# Patient Record
Sex: MALE | Race: ASIAN | NOT HISPANIC OR LATINO
[De-identification: names, ages, dates, MRNs, and addresses within clinical notes are randomized per-mention and may not be internally consistent; named-entity substitution may affect disease eponyms.]

---

## 2019-10-04 PROBLEM — Z00.00 ENCOUNTER FOR PREVENTIVE HEALTH EXAMINATION: Status: ACTIVE | Noted: 2019-10-04

## 2019-10-17 ENCOUNTER — APPOINTMENT (OUTPATIENT)
Dept: THORACIC SURGERY | Facility: CLINIC | Age: 74
End: 2019-10-17
Payer: MEDICARE

## 2019-10-17 VITALS
OXYGEN SATURATION: 97 % | BODY MASS INDEX: 21.19 KG/M2 | HEIGHT: 67 IN | TEMPERATURE: 97.6 F | RESPIRATION RATE: 16 BRPM | SYSTOLIC BLOOD PRESSURE: 97 MMHG | HEART RATE: 71 BPM | WEIGHT: 135 LBS | DIASTOLIC BLOOD PRESSURE: 56 MMHG

## 2019-10-17 DIAGNOSIS — B18.1 CHRONIC VIRAL HEPATITIS B W/OUT DELTA-AGENT: ICD-10-CM

## 2019-10-17 DIAGNOSIS — Z87.891 PERSONAL HISTORY OF NICOTINE DEPENDENCE: ICD-10-CM

## 2019-10-17 DIAGNOSIS — R59.0 LOCALIZED ENLARGED LYMPH NODES: ICD-10-CM

## 2019-10-17 PROCEDURE — 99205 OFFICE O/P NEW HI 60 MIN: CPT

## 2019-10-18 PROBLEM — B18.1 CHRONIC HEPATITIS B: Status: RESOLVED | Noted: 2019-10-18 | Resolved: 2019-10-18

## 2019-10-18 PROBLEM — Z87.891 FORMER SMOKER: Status: ACTIVE | Noted: 2019-10-18

## 2019-10-18 NOTE — REVIEW OF SYSTEMS
[Negative] : Heme/Lymph [As Noted in HPI] : as noted in HPI [Cough] : cough [SOB on Exertion] : shortness of breath during exertion

## 2019-10-21 NOTE — ASSESSMENT
[FreeTextEntry1] : Mr. GÓMEZ RODRIGUEZ, 73 year old male, former smoker, w/ hx of Hep B, who presented to PCP for routine screening for LUNG CA.\par \par CT Chest on 9/21/19:\par - new 9mm RUL nodule abutting the mediastinum (image 86), w/ a small amount of adjacent ggo and subtle areas of spiculation\par \par PFTs on 9/25/19: FVC 80%, FEV1 71%.\par \par PET/CT on 10/1/19:\par - 9 x 9mm SUV=4.6 RUL spiculated nodule abutting the pleura\par - subcentimeter Rt hilar LN w/ SUV=4.6 (image 110)\par - Rt hilar node (image 122)\par - subcarinal LN w/ SUV=3.2 (image 118)\par - Rt lower paratracheal LN with SUV=4.1\par - Lt tracheobronchial LN w/ SUV=3.4 (image 104)\par - Lt hilar LN w/ SUV=3.4 (image 111)\par - small para-aortic LN w/ SUV=2.2 (image 101)\par \par FB, EBUS bx on 10/4/19 at [place]. Path negative for malignancy. BAL negative malignancy.\par \par I have reviewed the patient's medical records and diagnostic images at time of this office consultation and have made the following recommendation:\par 1. PET/CT reviewed with pt and his daughter. Mediastinal lymphadenopathy, I recommended FB, mediastinoscopy, possible Rt VATS, robotic-assisted, wedge rxn of RUL, possible RULobectomy on 11/4/19. Risks and benefits and alternatives explained to patient, all questions answered, patient agreed to proceed with surgery.\par 2. Medical clearance and PST\par \par \par Written by Brittany Bey NP, acting as a scribe for Dr. Eligio Pringle. \par \par The documentation recorded by the scribe accurately reflects the service I personally performed and the decisions made by me. ELIGIO PRINGLE MD\par

## 2019-10-21 NOTE — REASON FOR VISIT
[Consultation] : a consultation visit [Spouse] : spouse [Other: _____] : [unfilled] [FreeTextEntry1] : Lung nodule

## 2019-10-21 NOTE — HISTORY OF PRESENT ILLNESS
[FreeTextEntry1] : Mr. GÓMEZ RODRIGUEZ, 73 year old male, former smoker, w/ hx of Hep B, who presented to PCP for routine screening for LUNG CA.\par \par CT Chest on 9/21/19:\par - new 9mm RUL nodule abutting the mediastinum (image 86), w/ a small amount of adjacent ggo and subtle areas of spiculation\par \par PFTs on 9/25/19: FVC 80%, FEV1 71%.\par \par PET/CT on 10/1/19:\par - 9 x 9mm SUV=4.6 RUL spiculated nodule abutting the pleura\par - subcentimeter Rt hilar LN w/ SUV=4.6 (image 110)\par - Rt hilar node (image 122)\par - subcarinal LN w/ SUV=3.2 (image 118)\par - Rt lower paratracheal LN with SUV=4.1\par - Lt tracheobronchial LN w/ SUV=3.4 (image 104)\par - Lt hilar LN w/ SUV=3.4 (image 111)\par - small para-aortic LN w/ SUV=2.2 (image 101)\par \par FB, EBUS bx on 10/4/19 at [place]. Path negative for malignancy. BAL negative malignancy.\par \par Patient is here today for CT Sx consultation, referred by Dr. Xavier Roa. Pt reports to cough with yellowish mucus and SOB on exertion, denies fever, chills, night sweats, hemoptysis, or weight loss.

## 2019-10-21 NOTE — DATA REVIEWED
[FreeTextEntry1] : CT Chest on 9/21/19:\par - new 9mm RUL nodule abutting the mediastinum (image 86), w/ a small amount of adjacent ggo and subtle areas of spiculation\par \par PFTs on 9/25/19: FVC 80%, FEV1 71%.\par \par PET/CT on 10/1/19:\par - 9 x 9mm SUV=4.6 RUL spiculated nodule abutting the pleura\par - subcentimeter Rt hilar LN w/ SUV=4.6 (image 110)\par - Rt hilar node (image 122)\par - subcarinal LN w/ SUV=3.2 (image 118)\par - Rt lower paratracheal LN with SUV=4.1\par - Lt tracheobronchial LN w/ SUV=3.4 (image 104)\par - Lt hilar LN w/ SUV=3.4 (image 111)\par - small para-aortic LN w/ SUV=2.2 (image 101)\par \par FB, EBUS bx on 10/4/19 at [place]. Path negative for malignancy. BAL negative malignancy.

## 2019-10-21 NOTE — CONSULT LETTER
[Consult Letter:] : I had the pleasure of evaluating your patient, [unfilled]. [Dear  ___] : Dear  [unfilled], [( Thank you for referring [unfilled] for consultation for _____ )] : Thank you for referring [unfilled] for consultation for [unfilled] [Please see my note below.] : Please see my note below. [Sincerely,] : Sincerely, [Consult Closing:] : Thank you very much for allowing me to participate in the care of this patient.  If you have any questions, please do not hesitate to contact me. [DrGalen  ___] : Dr. NAIDU [FreeTextEntry2] : Xavier Roa MD (PCP/Referring)\thaddeus Mercer MD (Pulm) [FreeTextEntry3] : Lon Khan MD, MPH \par System Director of Thoracic Surgery \par Director of Comprehensive Lung [facility] \par Professor Cardiovascular & Thoracic Surgery  \par [facility] [employer] [facility]\par

## 2019-10-25 ENCOUNTER — OUTPATIENT (OUTPATIENT)
Dept: OUTPATIENT SERVICES | Facility: HOSPITAL | Age: 74
LOS: 1 days | End: 2019-10-25
Payer: MEDICARE

## 2019-10-25 VITALS
WEIGHT: 138.01 LBS | OXYGEN SATURATION: 98 % | HEART RATE: 65 BPM | TEMPERATURE: 97 F | DIASTOLIC BLOOD PRESSURE: 84 MMHG | HEIGHT: 67 IN | SYSTOLIC BLOOD PRESSURE: 122 MMHG | RESPIRATION RATE: 14 BRPM

## 2019-10-25 DIAGNOSIS — R59.0 LOCALIZED ENLARGED LYMPH NODES: ICD-10-CM

## 2019-10-25 DIAGNOSIS — Z98.890 OTHER SPECIFIED POSTPROCEDURAL STATES: Chronic | ICD-10-CM

## 2019-10-25 LAB
ALBUMIN SERPL ELPH-MCNC: 4.5 G/DL — SIGNIFICANT CHANGE UP (ref 3.3–5)
ALP SERPL-CCNC: 80 U/L — SIGNIFICANT CHANGE UP (ref 40–120)
ALT FLD-CCNC: 23 U/L — SIGNIFICANT CHANGE UP (ref 4–41)
ANION GAP SERPL CALC-SCNC: 12 MMO/L — SIGNIFICANT CHANGE UP (ref 7–14)
AST SERPL-CCNC: 30 U/L — SIGNIFICANT CHANGE UP (ref 4–40)
BILIRUB SERPL-MCNC: < 0.2 MG/DL — LOW (ref 0.2–1.2)
BLD GP AB SCN SERPL QL: NEGATIVE — SIGNIFICANT CHANGE UP
BUN SERPL-MCNC: 20 MG/DL — SIGNIFICANT CHANGE UP (ref 7–23)
CALCIUM SERPL-MCNC: 9 MG/DL — SIGNIFICANT CHANGE UP (ref 8.4–10.5)
CHLORIDE SERPL-SCNC: 103 MMOL/L — SIGNIFICANT CHANGE UP (ref 98–107)
CO2 SERPL-SCNC: 23 MMOL/L — SIGNIFICANT CHANGE UP (ref 22–31)
CREAT SERPL-MCNC: 0.99 MG/DL — SIGNIFICANT CHANGE UP (ref 0.5–1.3)
GLUCOSE SERPL-MCNC: 127 MG/DL — HIGH (ref 70–99)
HCT VFR BLD CALC: 41.3 % — SIGNIFICANT CHANGE UP (ref 39–50)
HGB BLD-MCNC: 13.4 G/DL — SIGNIFICANT CHANGE UP (ref 13–17)
MCHC RBC-ENTMCNC: 31.8 PG — SIGNIFICANT CHANGE UP (ref 27–34)
MCHC RBC-ENTMCNC: 32.4 % — SIGNIFICANT CHANGE UP (ref 32–36)
MCV RBC AUTO: 97.9 FL — SIGNIFICANT CHANGE UP (ref 80–100)
NRBC # FLD: 0 K/UL — SIGNIFICANT CHANGE UP (ref 0–0)
PLATELET # BLD AUTO: 232 K/UL — SIGNIFICANT CHANGE UP (ref 150–400)
PMV BLD: 10.1 FL — SIGNIFICANT CHANGE UP (ref 7–13)
POTASSIUM SERPL-MCNC: 4.2 MMOL/L — SIGNIFICANT CHANGE UP (ref 3.5–5.3)
POTASSIUM SERPL-SCNC: 4.2 MMOL/L — SIGNIFICANT CHANGE UP (ref 3.5–5.3)
PROT SERPL-MCNC: 7.8 G/DL — SIGNIFICANT CHANGE UP (ref 6–8.3)
RBC # BLD: 4.22 M/UL — SIGNIFICANT CHANGE UP (ref 4.2–5.8)
RBC # FLD: 12.3 % — SIGNIFICANT CHANGE UP (ref 10.3–14.5)
RH IG SCN BLD-IMP: POSITIVE — SIGNIFICANT CHANGE UP
SODIUM SERPL-SCNC: 138 MMOL/L — SIGNIFICANT CHANGE UP (ref 135–145)
WBC # BLD: 6.62 K/UL — SIGNIFICANT CHANGE UP (ref 3.8–10.5)
WBC # FLD AUTO: 6.62 K/UL — SIGNIFICANT CHANGE UP (ref 3.8–10.5)

## 2019-10-25 PROCEDURE — 93010 ELECTROCARDIOGRAM REPORT: CPT

## 2019-10-25 RX ORDER — SODIUM CHLORIDE 9 MG/ML
1000 INJECTION, SOLUTION INTRAVENOUS
Refills: 0 | Status: DISCONTINUED | OUTPATIENT
Start: 2019-11-04 | End: 2019-11-05

## 2019-10-25 RX ORDER — SODIUM CHLORIDE 9 MG/ML
3 INJECTION INTRAMUSCULAR; INTRAVENOUS; SUBCUTANEOUS EVERY 8 HOURS
Refills: 0 | Status: DISCONTINUED | OUTPATIENT
Start: 2019-11-04 | End: 2019-11-05

## 2019-10-25 NOTE — H&P PST ADULT - NEGATIVE CARDIOVASCULAR SYMPTOMS
no orthopnea/no paroxysmal nocturnal dyspnea/no claudication/no peripheral edema/no chest pain/no palpitations

## 2019-10-25 NOTE — H&P PST ADULT - NEGATIVE SKIN SYMPTOMS
no dryness/no change in size/color of mole/no tumor/no pitted nails/no hair loss/no brittle nails/no rash/no itching

## 2019-10-25 NOTE — H&P PST ADULT - RS GEN PE MLT RESP DETAILS PC
no rhonchi/clear to auscultation bilaterally/breath sounds equal/no rales/good air movement/respirations non-labored/no wheezes

## 2019-10-25 NOTE — H&P PST ADULT - NEGATIVE ENMT SYMPTOMS
no hearing difficulty/no vertigo/no gum bleeding/no throat pain/no tinnitus/no nasal discharge/no sinus symptoms/no nasal congestion/no nasal obstruction/no dry mouth/no dysphagia/no ear pain

## 2019-10-25 NOTE — H&P PST ADULT - ASSESSMENT
Problem: localized enlarged lymph nodes, solitary pulmonary lung nodule    Assessment and Plan: Patient  scheduled for flexible bronchoscopy, mediastinoscopy possible right video assisted thoracoscopy robotic assisted right upper lobe wedge resection, possible right upper lobectomy on 11/04/19.  Patient provided with verbal and written presurgical instructions; verbalized understanding  with teach back.    Patient provided with famotidine for GI prophylaxis; verbalized understanding.    Patient provided with Chlorhexidine wash, verbal and written instructions reviewed. Patient demonstrated understanding with teach back.     Recent Echo and Stress test requested    Medical evaluation requested by surgeon and PST for dyspnea and low METs, patient verbalized understanding, will make appointment    Patient instructed to stop multivitamin and fish oil on 10/27/19

## 2019-10-25 NOTE — H&P PST ADULT - NSANTHOSAYNRD_GEN_A_CORE
No. ANDI screening performed.  STOP BANG Legend: 0-2 = LOW Risk; 3-4 = INTERMEDIATE Risk; 5-8 = HIGH Risk

## 2019-10-25 NOTE — H&P PST ADULT - HISTORY OF PRESENT ILLNESS
73 year old male presents to [facility] with preop diagnosis of localized enlarged lymph nodes, solitary pulmonary lung nodule scheduled for flexible bronchoscopy, mediastinoscopy possible right video assisted thoracoscopy robotic assisted right upper lobe wedge resection, possible right upper lobectomy on 11/04/19. Patient does speak english,  services used to confirm patient would like daughter (Quirino Durán) to translate. Patient reports a dry cough since the beginning of the year, no significant changes to cough, denies fever, sputum, hemoptysis or sleep disturbance. Patient is former smoker, 1 PPD for 52 years, recently quit in May 2019. Patient had CT on 09/19/19 which revealed a lung nodule in RUL Pet CT on 10/19/19 showed lymph node enlargement 73 year old male presents to [facility] with preop diagnosis of localized enlarged lymph nodes, solitary pulmonary lung nodule scheduled for flexible bronchoscopy, mediastinoscopy possible right video assisted thoracoscopy robotic assisted right upper lobe wedge resection, possible right upper lobectomy on 11/04/19. Patient does not speak english,  services used to confirm patient would like daughter (Quirino Durán) to translate. Patient reports a dry cough since the beginning of the year, no significant changes to cough, denies fever, sputum, hemoptysis or sleep disturbance. Patient is former smoker, 1 PPD for 52 years, recently quit in May 2019. CT on 09/19/19 which revealed a lung nodule in RUL Pet CT on 10/19/19 showed lymph node enlargement

## 2019-11-03 ENCOUNTER — TRANSCRIPTION ENCOUNTER (OUTPATIENT)
Age: 74
End: 2019-11-03

## 2019-11-04 ENCOUNTER — RESULT REVIEW (OUTPATIENT)
Age: 74
End: 2019-11-04

## 2019-11-04 ENCOUNTER — APPOINTMENT (OUTPATIENT)
Dept: THORACIC SURGERY | Facility: HOSPITAL | Age: 74
End: 2019-11-04

## 2019-11-04 ENCOUNTER — INPATIENT (INPATIENT)
Facility: HOSPITAL | Age: 74
LOS: 7 days | Discharge: HOME | End: 2019-11-12
Attending: THORACIC SURGERY (CARDIOTHORACIC VASCULAR SURGERY) | Admitting: THORACIC SURGERY (CARDIOTHORACIC VASCULAR SURGERY)
Payer: MEDICAID

## 2019-11-04 VITALS
OXYGEN SATURATION: 97 % | DIASTOLIC BLOOD PRESSURE: 70 MMHG | HEART RATE: 66 BPM | SYSTOLIC BLOOD PRESSURE: 129 MMHG | TEMPERATURE: 98 F | WEIGHT: 138.01 LBS | RESPIRATION RATE: 14 BRPM | HEIGHT: 67 IN

## 2019-11-04 DIAGNOSIS — R59.0 LOCALIZED ENLARGED LYMPH NODES: ICD-10-CM

## 2019-11-04 DIAGNOSIS — Z98.890 OTHER SPECIFIED POSTPROCEDURAL STATES: Chronic | ICD-10-CM

## 2019-11-04 LAB — RH IG SCN BLD-IMP: POSITIVE — SIGNIFICANT CHANGE UP

## 2019-11-04 PROCEDURE — 99233 SBSQ HOSP IP/OBS HIGH 50: CPT

## 2019-11-04 PROCEDURE — 88313 SPECIAL STAINS GROUP 2: CPT | Mod: 26

## 2019-11-04 PROCEDURE — 88309 TISSUE EXAM BY PATHOLOGIST: CPT | Mod: 26

## 2019-11-04 PROCEDURE — 32663 THORACOSCOPY W/LOBECTOMY: CPT | Mod: AS

## 2019-11-04 PROCEDURE — 88307 TISSUE EXAM BY PATHOLOGIST: CPT | Mod: 26

## 2019-11-04 PROCEDURE — 32652 THORACOSCOPY REM TOTL CORTEX: CPT | Mod: AS

## 2019-11-04 PROCEDURE — 32663 THORACOSCOPY W/LOBECTOMY: CPT

## 2019-11-04 PROCEDURE — S2900 ROBOTIC SURGICAL SYSTEM: CPT | Mod: NC

## 2019-11-04 PROCEDURE — 32668 THORACOSCOPY W/W RESECT DIAG: CPT

## 2019-11-04 PROCEDURE — 32674 THORACOSCOPY LYMPH NODE EXC: CPT

## 2019-11-04 PROCEDURE — 88305 TISSUE EXAM BY PATHOLOGIST: CPT | Mod: 26

## 2019-11-04 PROCEDURE — 88331 PATH CONSLTJ SURG 1 BLK 1SPC: CPT | Mod: 26

## 2019-11-04 PROCEDURE — 32668 THORACOSCOPY W/W RESECT DIAG: CPT | Mod: AS

## 2019-11-04 PROCEDURE — 71045 X-RAY EXAM CHEST 1 VIEW: CPT | Mod: 26

## 2019-11-04 PROCEDURE — 32652 THORACOSCOPY REM TOTL CORTEX: CPT

## 2019-11-04 PROCEDURE — 39402 MEDIASTINOSCPY W/LMPH NOD BX: CPT

## 2019-11-04 PROCEDURE — 32674 THORACOSCOPY LYMPH NODE EXC: CPT | Mod: AS

## 2019-11-04 RX ORDER — DIPHENHYDRAMINE HCL 50 MG
25 CAPSULE ORAL EVERY 4 HOURS
Refills: 0 | Status: DISCONTINUED | OUTPATIENT
Start: 2019-11-04 | End: 2019-11-05

## 2019-11-04 RX ORDER — HEPARIN SODIUM 5000 [USP'U]/ML
5000 INJECTION INTRAVENOUS; SUBCUTANEOUS EVERY 8 HOURS
Refills: 0 | Status: DISCONTINUED | OUTPATIENT
Start: 2019-11-04 | End: 2019-11-12

## 2019-11-04 RX ORDER — POLYETHYLENE GLYCOL 3350 17 G/17G
17 POWDER, FOR SOLUTION ORAL DAILY
Refills: 0 | Status: DISCONTINUED | OUTPATIENT
Start: 2019-11-04 | End: 2019-11-12

## 2019-11-04 RX ORDER — SENNA PLUS 8.6 MG/1
2 TABLET ORAL AT BEDTIME
Refills: 0 | Status: DISCONTINUED | OUTPATIENT
Start: 2019-11-04 | End: 2019-11-10

## 2019-11-04 RX ORDER — ONDANSETRON 8 MG/1
4 TABLET, FILM COATED ORAL EVERY 6 HOURS
Refills: 0 | Status: DISCONTINUED | OUTPATIENT
Start: 2019-11-04 | End: 2019-11-05

## 2019-11-04 RX ORDER — PANTOPRAZOLE SODIUM 20 MG/1
40 TABLET, DELAYED RELEASE ORAL
Refills: 0 | Status: DISCONTINUED | OUTPATIENT
Start: 2019-11-04 | End: 2019-11-12

## 2019-11-04 RX ORDER — OMEGA-3 ACID ETHYL ESTERS 1 G
1 CAPSULE ORAL
Qty: 0 | Refills: 0 | DISCHARGE

## 2019-11-04 RX ORDER — HYDROMORPHONE HYDROCHLORIDE 2 MG/ML
30 INJECTION INTRAMUSCULAR; INTRAVENOUS; SUBCUTANEOUS
Refills: 0 | Status: DISCONTINUED | OUTPATIENT
Start: 2019-11-04 | End: 2019-11-05

## 2019-11-04 RX ORDER — NALOXONE HYDROCHLORIDE 4 MG/.1ML
0.1 SPRAY NASAL
Refills: 0 | Status: DISCONTINUED | OUTPATIENT
Start: 2019-11-04 | End: 2019-11-05

## 2019-11-04 RX ORDER — HYDROMORPHONE HYDROCHLORIDE 2 MG/ML
0.5 INJECTION INTRAMUSCULAR; INTRAVENOUS; SUBCUTANEOUS
Refills: 0 | Status: DISCONTINUED | OUTPATIENT
Start: 2019-11-04 | End: 2019-11-05

## 2019-11-04 RX ORDER — HEPARIN SODIUM 5000 [USP'U]/ML
5000 INJECTION INTRAVENOUS; SUBCUTANEOUS ONCE
Refills: 0 | Status: COMPLETED | OUTPATIENT
Start: 2019-11-04 | End: 2019-11-04

## 2019-11-04 RX ORDER — DEXAMETHASONE 0.5 MG/5ML
4 ELIXIR ORAL EVERY 6 HOURS
Refills: 0 | Status: DISCONTINUED | OUTPATIENT
Start: 2019-11-04 | End: 2019-11-05

## 2019-11-04 RX ADMIN — SENNA PLUS 2 TABLET(S): 8.6 TABLET ORAL at 22:08

## 2019-11-04 RX ADMIN — HYDROMORPHONE HYDROCHLORIDE 30 MILLILITER(S): 2 INJECTION INTRAMUSCULAR; INTRAVENOUS; SUBCUTANEOUS at 19:25

## 2019-11-04 RX ADMIN — SODIUM CHLORIDE 30 MILLILITER(S): 9 INJECTION, SOLUTION INTRAVENOUS at 13:52

## 2019-11-04 RX ADMIN — SODIUM CHLORIDE 30 MILLILITER(S): 9 INJECTION, SOLUTION INTRAVENOUS at 19:25

## 2019-11-04 RX ADMIN — HEPARIN SODIUM 5000 UNIT(S): 5000 INJECTION INTRAVENOUS; SUBCUTANEOUS at 22:08

## 2019-11-04 RX ADMIN — HEPARIN SODIUM 5000 UNIT(S): 5000 INJECTION INTRAVENOUS; SUBCUTANEOUS at 13:51

## 2019-11-04 RX ADMIN — SODIUM CHLORIDE 3 MILLILITER(S): 9 INJECTION INTRAMUSCULAR; INTRAVENOUS; SUBCUTANEOUS at 21:29

## 2019-11-04 NOTE — BRIEF OPERATIVE NOTE - OPERATION/FINDINGS
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

## 2019-11-04 NOTE — PROGRESS NOTE ADULT - SUBJECTIVE AND OBJECTIVE BOX
GÓMEZ RODRIGUEZ  MRN# [id number]    Patient is a 73y old  Male who presents with a chief complaint of     HPI:  73 year old male with a history of hepatitis B was found to have enlarged lymph nodes and a solitary pulmonary right upper lung nodule and is s/p flexible bronchoscopy, mediastinoscopy, right video assisted thoracoscopy and robotic assisted right upper lobe wedge resection with completion lobectomy on 11/04/19. Per history the patient reported a dry cough since the beginning of the year. He denied fever, sputum, hemoptysis or sleep disturbance. The patient is former smoker, 1 PPD for 52 years, recently quit in May 2019. CT on 09/19/19 revealed a lung nodule in RUL. PET CT on 10/19/19 showed lymph node enlargement.    PAST MEDICAL & SURGICAL HISTORY:  Solitary lung nodule: RUL  History of viral hepatitis, type B  History of ear, nose, and throat (ENT) surgery: nose surgery in [place]    FAMILY HISTORY:  No pertinent family history in first degree relatives    Social History:  Denies illicit drug use or frequent alcohol consumption. Endorses prior smoking.     Allergies  No Known Allergies    MEDICATIONS  (STANDING):  heparin  Injectable 5000 Unit(s) SubCutaneous every 8 hours  HYDROmorphone PCA (1 mG/mL) 30 milliLiter(s) PCA Continuous PCA Continuous  lactated ringers. 1000 milliLiter(s) (30 mL/Hr) IV Continuous <Continuous>  pantoprazole    Tablet 40 milliGRAM(s) Oral before breakfast  polyethylene glycol 3350 17 Gram(s) Oral daily  senna 2 Tablet(s) Oral at bedtime  sodium chloride 0.9% lock flush 3 milliLiter(s) IV Push every 8 hours    MEDICATIONS  (PRN):  dexAMETHasone  Injectable 4 milliGRAM(s) IV Push every 6 hours PRN Nausea, IF ondansetron is ineffective after 30 - 60 minute  diphenhydrAMINE   Injectable 25 milliGRAM(s) IV Push every 4 hours PRN Pruritus  HYDROmorphone PCA (1 mG/mL) Rescue Clinician Bolus 0.5 milliGRAM(s) IV Push every 15 minutes PRN for Pain Scale GREATER THAN 6  naloxone Injectable 0.1 milliGRAM(s) IV Push every 3 minutes PRN For ANY of the following changes in patient status:  A. RR LESS THAN 10 breaths per minute, B. Oxygen saturation LESS THAN 90%, C. Sedation score of 6  ondansetron Injectable 4 milliGRAM(s) IV Push every 6 hours PRN Nausea    Review of Systems:  Constitutional:  Negative for weight change, fever, malaise  HEENT:  Negative for sinus pain, hoarseness, sore throat, dysphagia, vision changes  Cardiovascular:  Negative for chest pain, palpitations, dizziness  Respiratory:  Negative for cough, wheezing, dyspnea  Gastrointestinal:  Negative for nausea, vomiting, diarrhea, melena  Musculoskeletal:  Negative for pain, swelling, stiffness   Neuro:  Negative for weakness, numbness, headache  Psych:  Negative for anxiety, depression  Endocrine:  Negative for polyuria, polydipsia, temperature Intolerance    All other systems negative unless otherwise stated    ICU Vital Signs Last 24 Hrs  T(C): 36.3 (04 Nov 2019 18:45), Max: 36.4 (04 Nov 2019 13:02)  T(F): 97.4 (04 Nov 2019 18:45), Max: 97.6 (04 Nov 2019 13:02)  HR: 95 (04 Nov 2019 18:55) (66 - 95)  BP: 119/63 (04 Nov 2019 18:55) (119/63 - 129/70)  BP(mean): 72 (04 Nov 2019 18:55) (72 - 84)  ABP: --  ABP(mean): --  RR: 16 (04 Nov 2019 18:55) (12 - 17)  SpO2: 98% (04 Nov 2019 18:55) (97% - 99%)    Daily Height in cm: 170.18 (04 Nov 2019 13:02)    Daily   I&O's Summary    04 Nov 2019 07:01  -  04 Nov 2019 19:29  --------------------------------------------------------  IN: 30 mL / OUT: 15 mL / NET: 15 mL    Physical Exam:  Gen: Alert, no apparent distress  CV: Regular rate and rhythm, no murmurs, rubs or gallops  Pulm: Clear to auscultation bilaterally, no rales, rhonchi or wheezes  Chest: Chest tubes/drains in place with dressings clean, dry and intact  GI: Abd is soft, non-tender and non-distended with +BS  Ext: No clubbing, cyanosis or edema  Neuro: A+Ox3, follows commands and moves all extremities    Labs and Radiology: Pending    Assessment/Plan: 73 year old male with a history of hepatitis B was found to have enlarged lymph nodes and a solitary pulmonary right upper lung nodule and is s/p flexible bronchoscopy, mediastinoscopy, right video assisted thoracoscopy and robotic assisted right upper lobe wedge resection with completion lobectomy on 11/04/19.     Neuro:   Pain control with PCA / Tylenol IV                                           Cardiovascular:    Stable hemodynamics  Not on any pressors  Continue hemodynamic monitoring    Respiratory:  Pt is comfortable on nasal cannula  Encourage incentive spirometry  Monitor chest tube output  Chest tube to suction    GI:  On clears diet, advance as tolerated  Continue bowel regimen, Protonix  Continue Zofran for nausea - PRN	          Renal:  Continue LR 30CC/hr        Monitor I/Os and pending electrolytes    Hematologic / Oncology:  No signs of active bleeding                                         Monitor chest tube output    HSQ for DVT ppl  Follow pending CBC    Infectious disease:  All surgical incision / chest tube sites look clean  No signs of infection   Monitor for fever / leukocytosis    Endocrine:  Continue Accuchecks with coverage    All clinical, lab, hemodynamic and radiographic data were reviewed and the plan was discussed with CTICU team.     Tien Trejo MD

## 2019-11-05 LAB
ANION GAP SERPL CALC-SCNC: 17 MMO/L — HIGH (ref 7–14)
BASOPHILS # BLD AUTO: 0.02 K/UL — SIGNIFICANT CHANGE UP (ref 0–0.2)
BASOPHILS NFR BLD AUTO: 0.2 % — SIGNIFICANT CHANGE UP (ref 0–2)
BUN SERPL-MCNC: 22 MG/DL — SIGNIFICANT CHANGE UP (ref 7–23)
CALCIUM SERPL-MCNC: 8.9 MG/DL — SIGNIFICANT CHANGE UP (ref 8.4–10.5)
CHLORIDE SERPL-SCNC: 101 MMOL/L — SIGNIFICANT CHANGE UP (ref 98–107)
CO2 SERPL-SCNC: 15 MMOL/L — LOW (ref 22–31)
CREAT SERPL-MCNC: 0.91 MG/DL — SIGNIFICANT CHANGE UP (ref 0.5–1.3)
EOSINOPHIL # BLD AUTO: 0.13 K/UL — SIGNIFICANT CHANGE UP (ref 0–0.5)
EOSINOPHIL NFR BLD AUTO: 1.2 % — SIGNIFICANT CHANGE UP (ref 0–6)
GLUCOSE SERPL-MCNC: 198 MG/DL — HIGH (ref 70–99)
HCT VFR BLD CALC: 44.1 % — SIGNIFICANT CHANGE UP (ref 39–50)
HGB BLD-MCNC: 14.1 G/DL — SIGNIFICANT CHANGE UP (ref 13–17)
IMM GRANULOCYTES NFR BLD AUTO: 0.3 % — SIGNIFICANT CHANGE UP (ref 0–1.5)
LYMPHOCYTES # BLD AUTO: 0.65 K/UL — LOW (ref 1–3.3)
LYMPHOCYTES # BLD AUTO: 6 % — LOW (ref 13–44)
MCHC RBC-ENTMCNC: 31.7 PG — SIGNIFICANT CHANGE UP (ref 27–34)
MCHC RBC-ENTMCNC: 32 % — SIGNIFICANT CHANGE UP (ref 32–36)
MCV RBC AUTO: 99.1 FL — SIGNIFICANT CHANGE UP (ref 80–100)
MONOCYTES # BLD AUTO: 0.59 K/UL — SIGNIFICANT CHANGE UP (ref 0–0.9)
MONOCYTES NFR BLD AUTO: 5.5 % — SIGNIFICANT CHANGE UP (ref 2–14)
NEUTROPHILS # BLD AUTO: 9.39 K/UL — HIGH (ref 1.8–7.4)
NEUTROPHILS NFR BLD AUTO: 86.8 % — HIGH (ref 43–77)
NRBC # FLD: 0 K/UL — SIGNIFICANT CHANGE UP (ref 0–0)
PLATELET # BLD AUTO: 210 K/UL — SIGNIFICANT CHANGE UP (ref 150–400)
PMV BLD: 9.6 FL — SIGNIFICANT CHANGE UP (ref 7–13)
POTASSIUM SERPL-MCNC: 4.6 MMOL/L — SIGNIFICANT CHANGE UP (ref 3.5–5.3)
POTASSIUM SERPL-SCNC: 4.6 MMOL/L — SIGNIFICANT CHANGE UP (ref 3.5–5.3)
RBC # BLD: 4.45 M/UL — SIGNIFICANT CHANGE UP (ref 4.2–5.8)
RBC # FLD: 12.2 % — SIGNIFICANT CHANGE UP (ref 10.3–14.5)
SODIUM SERPL-SCNC: 133 MMOL/L — LOW (ref 135–145)
WBC # BLD: 10.81 K/UL — HIGH (ref 3.8–10.5)
WBC # FLD AUTO: 10.81 K/UL — HIGH (ref 3.8–10.5)

## 2019-11-05 PROCEDURE — 99233 SBSQ HOSP IP/OBS HIGH 50: CPT

## 2019-11-05 PROCEDURE — 71045 X-RAY EXAM CHEST 1 VIEW: CPT | Mod: 26

## 2019-11-05 RX ORDER — METOCLOPRAMIDE HCL 10 MG
10 TABLET ORAL EVERY 6 HOURS
Refills: 0 | Status: DISCONTINUED | OUTPATIENT
Start: 2019-11-05 | End: 2019-11-12

## 2019-11-05 RX ORDER — ACETAMINOPHEN 500 MG
650 TABLET ORAL EVERY 6 HOURS
Refills: 0 | Status: COMPLETED | OUTPATIENT
Start: 2019-11-05 | End: 2019-11-07

## 2019-11-05 RX ORDER — OXYCODONE HYDROCHLORIDE 5 MG/1
5 TABLET ORAL
Refills: 0 | Status: DISCONTINUED | OUTPATIENT
Start: 2019-11-05 | End: 2019-11-12

## 2019-11-05 RX ADMIN — SENNA PLUS 2 TABLET(S): 8.6 TABLET ORAL at 21:14

## 2019-11-05 RX ADMIN — Medication 650 MILLIGRAM(S): at 11:42

## 2019-11-05 RX ADMIN — HEPARIN SODIUM 5000 UNIT(S): 5000 INJECTION INTRAVENOUS; SUBCUTANEOUS at 14:03

## 2019-11-05 RX ADMIN — PANTOPRAZOLE SODIUM 40 MILLIGRAM(S): 20 TABLET, DELAYED RELEASE ORAL at 06:35

## 2019-11-05 RX ADMIN — HYDROMORPHONE HYDROCHLORIDE 30 MILLILITER(S): 2 INJECTION INTRAMUSCULAR; INTRAVENOUS; SUBCUTANEOUS at 07:16

## 2019-11-05 RX ADMIN — HEPARIN SODIUM 5000 UNIT(S): 5000 INJECTION INTRAVENOUS; SUBCUTANEOUS at 21:14

## 2019-11-05 RX ADMIN — SODIUM CHLORIDE 30 MILLILITER(S): 9 INJECTION, SOLUTION INTRAVENOUS at 07:16

## 2019-11-05 RX ADMIN — Medication 10 MILLIGRAM(S): at 10:34

## 2019-11-05 RX ADMIN — ONDANSETRON 4 MILLIGRAM(S): 8 TABLET, FILM COATED ORAL at 02:46

## 2019-11-05 RX ADMIN — Medication 650 MILLIGRAM(S): at 22:02

## 2019-11-05 RX ADMIN — POLYETHYLENE GLYCOL 3350 17 GRAM(S): 17 POWDER, FOR SOLUTION ORAL at 11:42

## 2019-11-05 RX ADMIN — SODIUM CHLORIDE 3 MILLILITER(S): 9 INJECTION INTRAMUSCULAR; INTRAVENOUS; SUBCUTANEOUS at 06:34

## 2019-11-05 RX ADMIN — HEPARIN SODIUM 5000 UNIT(S): 5000 INJECTION INTRAVENOUS; SUBCUTANEOUS at 06:35

## 2019-11-05 RX ADMIN — Medication 650 MILLIGRAM(S): at 21:14

## 2019-11-05 NOTE — PROGRESS NOTE ADULT - SUBJECTIVE AND OBJECTIVE BOX
POST ANESTHESIA EVALUATION    73y Male POSTOP DAY 1 S/P     MENTAL STATUS: Patient participation [x  ] Awake     [  ] Arousable     [  ] Sedated    AIRWAY PATENCY: [ x ] Satisfactory  [  ] Other:     Vital Signs Last 24 Hrs  T(C): 36.7 (05 Nov 2019 13:07), Max: 36.7 (05 Nov 2019 08:00)  T(F): 98 (05 Nov 2019 13:07), Max: 98 (05 Nov 2019 08:00)  HR: 75 (05 Nov 2019 13:07) (75 - 104)  BP: 115/61 (05 Nov 2019 13:07) (98/63 - 151/84)  BP(mean): 79 (05 Nov 2019 11:00) (72 - 100)  RR: 17 (05 Nov 2019 13:07) (10 - 21)  SpO2: 97% (05 Nov 2019 13:07) (94% - 100%)  I&O's Summary    04 Nov 2019 07:01  -  05 Nov 2019 07:00  --------------------------------------------------------  IN: 560 mL / OUT: 545 mL / NET: 15 mL    05 Nov 2019 07:01  -  05 Nov 2019 15:40  --------------------------------------------------------  IN: 90 mL / OUT: 700 mL / NET: -610 mL          NAUSEA/ VOMITTING:  [ x ] NONE  [  ] CONTROLLED [  ] OTHER     PAIN: [ x ] CONTROLLED WITH CURRENT REGIMEN  [  ] OTHER    [ x ] NO APPARENT ANESTHESIA COMPLICATIONS      Comments:

## 2019-11-05 NOTE — PROGRESS NOTE ADULT - SUBJECTIVE AND OBJECTIVE BOX
Anesthesia Pain Management Service    SUBJECTIVE: Patient is doing well with IV PCA and no significant problems reported.    Pain Scale Score	At rest: _2__ 	With Activity: ___ 	[X ] Refer to charted pain scores    THERAPY:    [ ] IV PCA Morphine		[ ] 5 mg/mL	[ ] 1 mg/mL  [X ] IV PCA Hydromorphone	[ ] 5 mg/mL	[X ] 1 mg/mL  [ ] IV PCA Fentanyl		[ ] 50 micrograms/mL    Demand dose __0.2_ lockout __6_ (minutes) Continuous Rate _0__ Total: _3.5__   mg used (in past 24 hrs)      MEDICATIONS  (STANDING):  acetaminophen   Tablet .. 650 milliGRAM(s) Oral every 6 hours  heparin  Injectable 5000 Unit(s) SubCutaneous every 8 hours  lactated ringers. 1000 milliLiter(s) (30 mL/Hr) IV Continuous <Continuous>  pantoprazole    Tablet 40 milliGRAM(s) Oral before breakfast  polyethylene glycol 3350 17 Gram(s) Oral daily  senna 2 Tablet(s) Oral at bedtime  sodium chloride 0.9% lock flush 3 milliLiter(s) IV Push every 8 hours    MEDICATIONS  (PRN):  oxyCODONE    IR 5 milliGRAM(s) Oral every 3 hours PRN Severe Pain (7 - 10)      OBJECTIVE: sitting in cahir     Sedation Score:	[ X] Alert	[ ] Drowsy 	[ ] Arousable	[ ] Asleep	[ ] Unresponsive    Side Effects:	[  ] None	[X ] Nausea	[ ] Vomiting	[ ] Pruritus  		[ ] Other:    Vital Signs Last 24 Hrs  T(C): 36.7 (05 Nov 2019 08:00), Max: 36.7 (05 Nov 2019 08:00)  T(F): 98 (05 Nov 2019 08:00), Max: 98 (05 Nov 2019 08:00)  HR: 82 (05 Nov 2019 08:00) (66 - 104)  BP: 123/69 (05 Nov 2019 08:00) (98/63 - 151/84)  BP(mean): 83 (05 Nov 2019 08:00) (72 - 100)  RR: 10 (05 Nov 2019 08:00) (10 - 21)  SpO2: 97% (05 Nov 2019 08:00) (94% - 100%)    ASSESSMENT/ PLAN    Therapy to  be:	[ ] Continue   [ X] Discontinued   [X ] Change to prn Analgesics    Documentation and Verification of current medications:   [X] Done	[ ] Not done, not elligible    Comments: RN reports patient vomiting after receiving dose through pca pump will d/c. PRN Oral/IV opioids and/or Adjuvant medication to be ordered at this point.

## 2019-11-05 NOTE — PROGRESS NOTE ADULT - SUBJECTIVE AND OBJECTIVE BOX
GÓMEZ RODRIGUEZ  [id number]    Patient is a 73y old  Male who presents with a chief complaint of Lobectomy (2019 19:28)    HPI:  73 year old male with a history of hepatitis B was found to have enlarged lymph nodes and a solitary pulmonary right upper lung nodule and is s/p flexible bronchoscopy, mediastinoscopy, right video assisted thoracoscopy and robotic assisted right upper lobe wedge resection with completion lobectomy on 19. Per history the patient reported a dry cough since the beginning of the year. He denied fever, sputum, hemoptysis or sleep disturbance. The patient is former smoker, 1 PPD for 52 years, recently quit in May 2019. CT on 19 revealed a lung nodule in RUL. PET CT on 10/19/19 showed lymph node enlargement.    PAST MEDICAL & SURGICAL HISTORY:  Solitary lung nodule: RUL  History of viral hepatitis, type B  History of ear, nose, and throat (ENT) surgery: nose surgery in [place]    FAMILY HISTORY:  No pertinent family history in first degree relatives    Social History:  Denies illicit drug use or frequent alcohol consumption. Endorses prior smoking.     Allergies  No Known Allergies    MEDICATIONS  (STANDING):  heparin  Injectable 5000 Unit(s) SubCutaneous every 8 hours  HYDROmorphone PCA (1 mG/mL) 30 milliLiter(s) PCA Continuous PCA Continuous  lactated ringers. 1000 milliLiter(s) (30 mL/Hr) IV Continuous <Continuous>  pantoprazole    Tablet 40 milliGRAM(s) Oral before breakfast  polyethylene glycol 3350 17 Gram(s) Oral daily  senna 2 Tablet(s) Oral at bedtime  sodium chloride 0.9% lock flush 3 milliLiter(s) IV Push every 8 hours    MEDICATIONS  (PRN):  dexAMETHasone  Injectable 4 milliGRAM(s) IV Push every 6 hours PRN Nausea, IF ondansetron is ineffective after 30 - 60 minute  diphenhydrAMINE   Injectable 25 milliGRAM(s) IV Push every 4 hours PRN Pruritus  HYDROmorphone PCA (1 mG/mL) Rescue Clinician Bolus 0.5 milliGRAM(s) IV Push every 15 minutes PRN for Pain Scale GREATER THAN 6  naloxone Injectable 0.1 milliGRAM(s) IV Push every 3 minutes PRN For ANY of the following changes in patient status:  A. RR LESS THAN 10 breaths per minute, B. Oxygen saturation LESS THAN 90%, C. Sedation score of 6  ondansetron Injectable 4 milliGRAM(s) IV Push every 6 hours PRN Nausea    Review of Systems:  Constitutional:  Negative for weight change, fever, malaise  HEENT:  Negative for sinus pain, hoarseness, sore throat, dysphagia, vision changes  Cardiovascular:  Negative for chest pain, palpitations, dizziness  Respiratory:  Negative for cough, wheezing, dyspnea  Gastrointestinal:  Negative for nausea, vomiting, diarrhea, melena  Musculoskeletal:  Negative for pain, swelling, stiffness   Neuro:  Negative for weakness, numbness, headache  Psych:  Negative for anxiety, depression  Endocrine:  Negative for polyuria, polydipsia, temperature Intolerance    All other systems negative unless otherwise stated    ICU Vital Signs Last 24 Hrs  T(C): 36.4 (2019 04:00), Max: 36.4 (2019 13:02)  T(F): 97.6 (2019 04:00), Max: 97.6 (2019 13:02)  HR: 78 (2019 06:00) (66 - 104)  BP: 112/71 (2019 06:00) (98/63 - 151/84)  BP(mean): 79 (2019 06:00) (72 - 100)  ABP: --  ABP(mean): --  RR: 14 (2019 06:00) (12 - 21)  SpO2: 97% (2019 06:00) (94% - 100%)    Daily Height in cm: 170.18 (2019 13:02)    Daily Weight in k.3 (2019 06:00)  I&O's Summary    2019 07:01  -  2019 07:00  --------------------------------------------------------  IN: 360 mL / OUT: 545 mL / NET: -185 mL    Physical Exam:  Gen: Alert, no apparent distress  CV: Regular rate and rhythm no murmurs, rubs or gallops  Pulm: Clear to auscultation bilaterally, no rales, rhonchi or wheezes  Chest: Chest tubes/drains in place with dressings clean, dry and intact  GI: Abd is soft, non-tender and non-distended with +BS  Ext: No clubbing, cyanosis or edema  Neuro: A+Ox3, follows commands and moves all extremities    Labs:                          14.1   10.81 )-----------( 210      ( 2019 02:30 )             44.1       11-05    133<L>  |  101  |  22  ----------------------------<  198<H>  4.6   |  15<L>  |  0.91    Ca    8.9      2019 02:30    Assessment/Plan: 73 year old male with a history of hepatitis B was found to have enlarged lymph nodes and a solitary pulmonary right upper lung nodule and is s/p flexible bronchoscopy, mediastinoscopy, right video assisted thoracoscopy and robotic assisted right upper lobe wedge resection with completion lobectomy on 19.     Neuro:   Pain control with PCA / Tylenol IV                                           Cardiovascular:    Stable hemodynamics  Not on any pressors  Continue hemodynamic monitoring    Respiratory:  Pt is comfortable on nasal cannula  Encourage incentive spirometry  Monitor chest tube output  Chest tube to suction    GI:  On clears diet, advance as tolerated  Continue bowel regimen, Protonix  Continue Zofran for nausea - PRN	          Renal:  Continue LR 30CC/hr        Monitor I/Os and electrolytes    Hematologic / Oncology:  No signs of active bleeding                                         Monitor chest tube output    HSQ for DVT ppl  Follow CBC    Infectious disease:  All surgical incision / chest tube sites look clean  No signs of infection   Monitor for fever / leukocytosis    Endocrine:  Continue Accuchecks with coverage    All clinical, lab, hemodynamic and radiographic data were reviewed and the plan was discussed with CTICU team.     Tien Trejo MD

## 2019-11-05 NOTE — PROGRESS NOTE ADULT - SUBJECTIVE AND OBJECTIVE BOX
Anesthesia Pain Management Service- Attending Addendum    SUBJECTIVE: Patient's pain control adequate    Therapy:	  [ X] IV PCA	   [ ] Epidural           [ ] s/p Spinal Opoid              [ ] Postpartum infusion	  [ ] Patient controlled regional anesthesia (PCRA)    [ ] prn Analgesics    Allergies    No Known Allergies    Intolerances      MEDICATIONS  (STANDING):  acetaminophen   Tablet .. 650 milliGRAM(s) Oral every 6 hours  heparin  Injectable 5000 Unit(s) SubCutaneous every 8 hours  pantoprazole    Tablet 40 milliGRAM(s) Oral before breakfast  polyethylene glycol 3350 17 Gram(s) Oral daily  senna 2 Tablet(s) Oral at bedtime    MEDICATIONS  (PRN):  metoclopramide Injectable 10 milliGRAM(s) IV Push every 6 hours PRN Nausea & Vomiting  oxyCODONE    IR 5 milliGRAM(s) Oral every 3 hours PRN Severe Pain (7 - 10)      OBJECTIVE:   [X] No new signs     [ ] Other:    Side Effects:  [X ] None			[ ] Other:      ASSESSMENT/PLAN  -Discontinue current therapy    [ ] Therapy changed to:    [ ] IV PCA       [ ] Epidural     [ X] prn Analgesics     Comments: Pain management per primary team, APS to sign off

## 2019-11-06 ENCOUNTER — RESULT REVIEW (OUTPATIENT)
Age: 74
End: 2019-11-06

## 2019-11-06 LAB
ANION GAP SERPL CALC-SCNC: 11 MMO/L — SIGNIFICANT CHANGE UP (ref 7–14)
BUN SERPL-MCNC: 25 MG/DL — HIGH (ref 7–23)
CALCIUM SERPL-MCNC: 8.7 MG/DL — SIGNIFICANT CHANGE UP (ref 8.4–10.5)
CHLORIDE SERPL-SCNC: 101 MMOL/L — SIGNIFICANT CHANGE UP (ref 98–107)
CO2 SERPL-SCNC: 25 MMOL/L — SIGNIFICANT CHANGE UP (ref 22–31)
CREAT SERPL-MCNC: 1.05 MG/DL — SIGNIFICANT CHANGE UP (ref 0.5–1.3)
GLUCOSE SERPL-MCNC: 125 MG/DL — HIGH (ref 70–99)
HCT VFR BLD CALC: 41.7 % — SIGNIFICANT CHANGE UP (ref 39–50)
HGB BLD-MCNC: 13.3 G/DL — SIGNIFICANT CHANGE UP (ref 13–17)
MCHC RBC-ENTMCNC: 31.4 PG — SIGNIFICANT CHANGE UP (ref 27–34)
MCHC RBC-ENTMCNC: 31.9 % — LOW (ref 32–36)
MCV RBC AUTO: 98.6 FL — SIGNIFICANT CHANGE UP (ref 80–100)
NRBC # FLD: 0 K/UL — SIGNIFICANT CHANGE UP (ref 0–0)
PLATELET # BLD AUTO: 214 K/UL — SIGNIFICANT CHANGE UP (ref 150–400)
PMV BLD: 10.2 FL — SIGNIFICANT CHANGE UP (ref 7–13)
POTASSIUM SERPL-MCNC: 4.3 MMOL/L — SIGNIFICANT CHANGE UP (ref 3.5–5.3)
POTASSIUM SERPL-SCNC: 4.3 MMOL/L — SIGNIFICANT CHANGE UP (ref 3.5–5.3)
RBC # BLD: 4.23 M/UL — SIGNIFICANT CHANGE UP (ref 4.2–5.8)
RBC # FLD: 12.5 % — SIGNIFICANT CHANGE UP (ref 10.3–14.5)
SODIUM SERPL-SCNC: 137 MMOL/L — SIGNIFICANT CHANGE UP (ref 135–145)
WBC # BLD: 9.82 K/UL — SIGNIFICANT CHANGE UP (ref 3.8–10.5)
WBC # FLD AUTO: 9.82 K/UL — SIGNIFICANT CHANGE UP (ref 3.8–10.5)

## 2019-11-06 PROCEDURE — 71045 X-RAY EXAM CHEST 1 VIEW: CPT | Mod: 26,76

## 2019-11-06 RX ADMIN — HEPARIN SODIUM 5000 UNIT(S): 5000 INJECTION INTRAVENOUS; SUBCUTANEOUS at 13:43

## 2019-11-06 RX ADMIN — Medication 650 MILLIGRAM(S): at 19:13

## 2019-11-06 RX ADMIN — PANTOPRAZOLE SODIUM 40 MILLIGRAM(S): 20 TABLET, DELAYED RELEASE ORAL at 05:15

## 2019-11-06 RX ADMIN — Medication 650 MILLIGRAM(S): at 23:16

## 2019-11-06 RX ADMIN — Medication 650 MILLIGRAM(S): at 13:05

## 2019-11-06 RX ADMIN — Medication 650 MILLIGRAM(S): at 05:15

## 2019-11-06 RX ADMIN — POLYETHYLENE GLYCOL 3350 17 GRAM(S): 17 POWDER, FOR SOLUTION ORAL at 12:25

## 2019-11-06 RX ADMIN — Medication 650 MILLIGRAM(S): at 12:25

## 2019-11-06 RX ADMIN — SENNA PLUS 2 TABLET(S): 8.6 TABLET ORAL at 23:16

## 2019-11-06 RX ADMIN — HEPARIN SODIUM 5000 UNIT(S): 5000 INJECTION INTRAVENOUS; SUBCUTANEOUS at 23:16

## 2019-11-06 RX ADMIN — Medication 650 MILLIGRAM(S): at 18:11

## 2019-11-06 RX ADMIN — HEPARIN SODIUM 5000 UNIT(S): 5000 INJECTION INTRAVENOUS; SUBCUTANEOUS at 05:15

## 2019-11-06 NOTE — PROGRESS NOTE ADULT - SUBJECTIVE AND OBJECTIVE BOX
Subjective: Translation done at bedside with DR. Khan. Pt's wife at bedside. Pt states some pain at CT site, otherwise no other complaints. OOB w assist.     Vital Signs:  Vital Signs Last 24 Hrs  T(C): 36.6 (11-06-19 @ 12:14), Max: 36.7 (11-05-19 @ 13:07)  T(F): 97.9 (11-06-19 @ 12:14), Max: 98 (11-05-19 @ 13:07)  HR: 77 (11-06-19 @ 12:14) (75 - 85)  BP: 106/65 (11-06-19 @ 12:14) (101/60 - 115/61)  RR: 18 (11-06-19 @ 12:14) (17 - 18)  SpO2: 96% (11-06-19 @ 12:14) (95% - 97%) on (O2)    Telemetry/Alarms:  General: WN/WD NAD  Neurology: Awake, nonfocal, MENON x 4  Eyes: Scleras clear, PERRLA/ EOMI, Gross vision intact  ENT:Gross hearing intact, grossly patent pharynx, no stridor  Neck: Neck supple, trachea midline, No JVD,   Respiratory: CTA B/L, No wheezing, rales, rhonchi. Dec BS rt. apex  CV: RRR, S1S2, no murmurs, rubs or gallops  Abdominal: Soft, NT, ND +BS, +void, no BM  Extremities: No edema, + peripheral pulses  Skin: No Rashes, Hematoma, Ecchymosis  Lymphatic: No Neck, axilla, groin LAD  Psych: Oriented x 3, normal affect  Incisions: Rt. VATS c/d/i  Tubes: Rt. CT- 280cc/24hrs, on sxn. Expiratory air leak.   Relevant labs, radiology and Medications reviewed           CXR no obvious ptx             13.3   9.82  )-----------( 214      ( 06 Nov 2019 05:21 )             41.7     11-06    137  |  101  |  25<H>  ----------------------------<  125<H>  4.3   |  25  |  1.05    Ca    8.7      06 Nov 2019 05:21        MEDICATIONS  (STANDING):  acetaminophen   Tablet .. 650 milliGRAM(s) Oral every 6 hours  heparin  Injectable 5000 Unit(s) SubCutaneous every 8 hours  pantoprazole    Tablet 40 milliGRAM(s) Oral before breakfast  polyethylene glycol 3350 17 Gram(s) Oral daily  senna 2 Tablet(s) Oral at bedtime    MEDICATIONS  (PRN):  metoclopramide Injectable 10 milliGRAM(s) IV Push every 6 hours PRN Nausea & Vomiting  oxyCODONE    IR 5 milliGRAM(s) Oral every 3 hours PRN Severe Pain (7 - 10)    Pertinent Physical Exam  I&O's Summary    05 Nov 2019 07:01  -  06 Nov 2019 07:00  --------------------------------------------------------  IN: 570 mL / OUT: 1430 mL / NET: -860 mL    06 Nov 2019 07:01  -  06 Nov 2019 12:27  --------------------------------------------------------  IN: 200 mL / OUT: 380 mL / NET: -180 mL        Assessment  73y Male  w/ PAST MEDICAL & SURGICAL HISTORY:  Solitary lung nodule: RUL  History of viral hepatitis, type B  History of ear, nose, and throat (ENT) surgery: nose surgery in [place]  admitted with complaints of Patient is a 73y old  Male who presents with a chief complaint of Lobectomy (05 Nov 2019 07:07)  HPI:  73 year old male with a history of hepatitis B was found to have enlarged lymph nodes and a solitary pulmonary right upper lung nodule and is s/p flexible bronchoscopy, mediastinoscopy, right video assisted thoracoscopy and robotic assisted right upper lobe wedge resection with completion lobectomy on 11/04/19. Per history the patient reported a dry cough since the beginning of the year. He denied fever, sputum, hemoptysis or sleep disturbance. The patient is former smoker, 1 PPD for 52 years, recently quit in May 2019. CT on 09/19/19 revealed a lung nodule in RUL. PET CT on 10/19/19 showed lymph node enlargement. Post op pt with air leak. 11/5-transferred to [facility], 11/6-Trial of waterseal    PLAN  Neuro: Pain management  Pulm: Encourage coughing, deep breathing and use of incentive spirometry. Wean off supplemental oxygen as able. Daily CXR.   Cardio: Monitor telemetry/alarms  GI: Tolerating diet. Continue stool softeners.  Renal: monitor urine output, supplement electrolytes as needed  Vasc: Heparin SC/SCDs for DVT prophylaxis  Heme: Stable H/H. .   ID: Off antibiotics. Stable.  Therapy: OOB/ambulate  Tubes: Monitor Chest tube output and air leak. CT placed to [facility], will fu w rpt. CXR   Disposition: Aim to D/C to home once CT removed.   Discussed with Cardiothoracic Team at AM rounds.

## 2019-11-07 PROCEDURE — 71045 X-RAY EXAM CHEST 1 VIEW: CPT | Mod: 26,76

## 2019-11-07 RX ADMIN — HEPARIN SODIUM 5000 UNIT(S): 5000 INJECTION INTRAVENOUS; SUBCUTANEOUS at 13:09

## 2019-11-07 RX ADMIN — Medication 650 MILLIGRAM(S): at 06:42

## 2019-11-07 RX ADMIN — SENNA PLUS 2 TABLET(S): 8.6 TABLET ORAL at 21:25

## 2019-11-07 RX ADMIN — OXYCODONE HYDROCHLORIDE 5 MILLIGRAM(S): 5 TABLET ORAL at 17:29

## 2019-11-07 RX ADMIN — Medication 650 MILLIGRAM(S): at 00:16

## 2019-11-07 RX ADMIN — Medication 650 MILLIGRAM(S): at 05:42

## 2019-11-07 RX ADMIN — OXYCODONE HYDROCHLORIDE 5 MILLIGRAM(S): 5 TABLET ORAL at 16:41

## 2019-11-07 RX ADMIN — HEPARIN SODIUM 5000 UNIT(S): 5000 INJECTION INTRAVENOUS; SUBCUTANEOUS at 21:25

## 2019-11-07 RX ADMIN — OXYCODONE HYDROCHLORIDE 5 MILLIGRAM(S): 5 TABLET ORAL at 23:29

## 2019-11-07 RX ADMIN — HEPARIN SODIUM 5000 UNIT(S): 5000 INJECTION INTRAVENOUS; SUBCUTANEOUS at 05:42

## 2019-11-07 RX ADMIN — PANTOPRAZOLE SODIUM 40 MILLIGRAM(S): 20 TABLET, DELAYED RELEASE ORAL at 05:42

## 2019-11-07 RX ADMIN — POLYETHYLENE GLYCOL 3350 17 GRAM(S): 17 POWDER, FOR SOLUTION ORAL at 13:09

## 2019-11-07 NOTE — PROGRESS NOTE ADULT - SUBJECTIVE AND OBJECTIVE BOX
Subjective: no acute complaints    Vital Signs:  Vital Signs Last 24 Hrs  T(C): 36.7 (11-07-19 @ 16:12), Max: 36.9 (11-06-19 @ 16:33)  T(F): 98 (11-07-19 @ 16:12), Max: 98.4 (11-06-19 @ 16:33)  HR: 85 (11-07-19 @ 16:12) (75 - 86)  BP: 130/81 (11-07-19 @ 16:12) (107/78 - 130/81)  RR: 18 (11-07-19 @ 16:12) (16 - 18)  SpO2: 99% (11-07-19 @ 16:12) (96% - 99%) on (O2)    Telemetry/Alarms:  General: WN/WD NAD  Neurology: Awake, nonfocal, MENON x 4  Eyes: Scleras clear, PERRLA/ EOMI, Gross vision intact  ENT:Gross hearing intact, grossly patent pharynx, no stridor  Neck: Neck supple, trachea midline, No JVD,   Respiratory: CTA B/L, No wheezing, rales, rhonchi  CV: RRR, S1S2, no murmurs, rubs or gallops  Abdominal: Soft, NT, ND +BS,   Extremities: No edema, + peripheral pulses  Skin: No Rashes, Hematoma, Ecchymosis  Lymphatic: No Neck, axilla, groin LAD  Psych: Oriented x 3, normal affect  Incisions: c,d,i  Tubes: chest tube suction this AM, placed to waterseal for repeat waterseal trial; drained 220 no air leak  Relevant labs, radiology and Medications reviewed                        13.3   9.82  )-----------( 214      ( 06 Nov 2019 05:21 )             41.7     11-06    137  |  101  |  25<H>  ----------------------------<  125<H>  4.3   |  25  |  1.05    Ca    8.7      06 Nov 2019 05:21        MEDICATIONS  (STANDING):  heparin  Injectable 5000 Unit(s) SubCutaneous every 8 hours  pantoprazole    Tablet 40 milliGRAM(s) Oral before breakfast  polyethylene glycol 3350 17 Gram(s) Oral daily  senna 2 Tablet(s) Oral at bedtime    MEDICATIONS  (PRN):  metoclopramide Injectable 10 milliGRAM(s) IV Push every 6 hours PRN Nausea & Vomiting  oxyCODONE    IR 5 milliGRAM(s) Oral every 3 hours PRN Severe Pain (7 - 10)    Pertinent Physical Exam  I&O's Summary    06 Nov 2019 07:01  -  07 Nov 2019 07:00  --------------------------------------------------------  IN: 1040 mL / OUT: 1570 mL / NET: -530 mL    07 Nov 2019 07:01  -  07 Nov 2019 16:29  --------------------------------------------------------  IN: 0 mL / OUT: 0 mL / NET: 0 mL        Assessment  73y Male  w/ PAST MEDICAL & SURGICAL HISTORY:  Solitary lung nodule: RUL  History of viral hepatitis, type B  History of ear, nose, and throat (ENT) surgery: nose surgery in [place]  admitted with complaints of Patient is a 73y old  Male who presents with a chief complaint of Lobectomy (05 Nov 2019 07:07)  HPI:  73 year old male with a history of hepatitis B was found to have enlarged lymph nodes and a solitary pulmonary right upper lung nodule and is s/p flexible bronchoscopy, mediastinoscopy, right video assisted thoracoscopy and robotic assisted right upper lobe wedge resection with completion lobectomy on 11/04/19. Per history the patient reported a dry cough since the beginning of the year. He denied fever, sputum, hemoptysis or sleep disturbance. The patient is former smoker, 1 PPD for 52 years, recently quit in May 2019. CT on 09/19/19 revealed a lung nodule in RUL. PET CT on 10/19/19 showed lymph node enlargement. Post op pt with air leak. 11/5-transferred to [facility], 11/6-Trial of waterseal    PLAN  Neuro: Pain management  Pulm: Encourage coughing, deep breathing and use of incentive spirometry. Wean off supplemental oxygen as able. Daily CXR.   Cardio: Monitor telemetry/alarms  GI: Tolerating diet. Continue stool softeners.  Renal: monitor urine output, supplement electrolytes as needed  Vasc: Heparin SC/SCDs for DVT prophylaxis  Heme: Stable H/H. .   ID: Off antibiotics. Stable.  Therapy: OOB/ambulate  Tubes: Monitor Chest tube output and air leak. CT placed to [facility], will fu w rpt. CXR   Disposition: Aim to D/C to home once CT removed.  Likely tomorrow  Discussed with Cardiothoracic Team at AM rounds.

## 2019-11-08 PROBLEM — R91.1 SOLITARY PULMONARY NODULE: Chronic | Status: ACTIVE | Noted: 2019-10-25

## 2019-11-08 PROBLEM — Z86.19 PERSONAL HISTORY OF OTHER INFECTIOUS AND PARASITIC DISEASES: Chronic | Status: ACTIVE | Noted: 2019-10-25

## 2019-11-08 PROCEDURE — 71045 X-RAY EXAM CHEST 1 VIEW: CPT | Mod: 26

## 2019-11-08 RX ORDER — MAGNESIUM HYDROXIDE 400 MG/1
30 TABLET, CHEWABLE ORAL DAILY
Refills: 0 | Status: DISCONTINUED | OUTPATIENT
Start: 2019-11-08 | End: 2019-11-12

## 2019-11-08 RX ADMIN — MAGNESIUM HYDROXIDE 30 MILLILITER(S): 400 TABLET, CHEWABLE ORAL at 16:16

## 2019-11-08 RX ADMIN — OXYCODONE HYDROCHLORIDE 5 MILLIGRAM(S): 5 TABLET ORAL at 23:50

## 2019-11-08 RX ADMIN — HEPARIN SODIUM 5000 UNIT(S): 5000 INJECTION INTRAVENOUS; SUBCUTANEOUS at 13:21

## 2019-11-08 RX ADMIN — Medication 5 MILLIGRAM(S): at 05:06

## 2019-11-08 RX ADMIN — PANTOPRAZOLE SODIUM 40 MILLIGRAM(S): 20 TABLET, DELAYED RELEASE ORAL at 05:06

## 2019-11-08 RX ADMIN — OXYCODONE HYDROCHLORIDE 5 MILLIGRAM(S): 5 TABLET ORAL at 12:17

## 2019-11-08 RX ADMIN — OXYCODONE HYDROCHLORIDE 5 MILLIGRAM(S): 5 TABLET ORAL at 00:29

## 2019-11-08 RX ADMIN — HEPARIN SODIUM 5000 UNIT(S): 5000 INJECTION INTRAVENOUS; SUBCUTANEOUS at 23:08

## 2019-11-08 RX ADMIN — OXYCODONE HYDROCHLORIDE 5 MILLIGRAM(S): 5 TABLET ORAL at 13:00

## 2019-11-08 RX ADMIN — OXYCODONE HYDROCHLORIDE 5 MILLIGRAM(S): 5 TABLET ORAL at 19:56

## 2019-11-08 RX ADMIN — SENNA PLUS 2 TABLET(S): 8.6 TABLET ORAL at 23:09

## 2019-11-08 RX ADMIN — OXYCODONE HYDROCHLORIDE 5 MILLIGRAM(S): 5 TABLET ORAL at 23:09

## 2019-11-08 RX ADMIN — POLYETHYLENE GLYCOL 3350 17 GRAM(S): 17 POWDER, FOR SOLUTION ORAL at 12:17

## 2019-11-08 RX ADMIN — OXYCODONE HYDROCHLORIDE 5 MILLIGRAM(S): 5 TABLET ORAL at 20:40

## 2019-11-08 RX ADMIN — HEPARIN SODIUM 5000 UNIT(S): 5000 INJECTION INTRAVENOUS; SUBCUTANEOUS at 05:06

## 2019-11-08 NOTE — PROGRESS NOTE ADULT - SUBJECTIVE AND OBJECTIVE BOX
Subjective: Doing well  no major complaints, pain controlled. +Airleak noted on expiration  Chest tube remains h20 seal.     Vital Signs:  Vital Signs Last 24 Hrs  T(C): 36.9 (11-08-19 @ 08:40), Max: 36.9 (11-07-19 @ 23:44)  T(F): 98.5 (11-08-19 @ 08:40), Max: 98.5 (11-07-19 @ 23:44)  HR: 82 (11-08-19 @ 08:40) (75 - 89)  BP: 127/78 (11-08-19 @ 08:40) (100/67 - 130/81)  RR: 16 (11-08-19 @ 08:40) (16 - 18)  SpO2: 97% (11-08-19 @ 08:40) (95% - 99%) on (O2)    Telemetry/Alarms:  General: WN/WD NAD  Neurology: Awake, nonfocal, MENON x 4  Eyes: Scleras clear, PERRLA/ EOMI, Gross vision intact  ENT:Gross hearing intact, grossly patent pharynx, no stridor  Neck: Neck supple, trachea midline, No JVD,   Respiratory: CTA B/L, No wheezing, rales, rhonchi  CV: RRR, S1S2, no murmurs, rubs or gallops  Abdominal: Soft, NT, ND +BS,   Extremities: No edema, + peripheral pulses  Skin: No Rashes, Hematoma, Ecchymosis  Lymphatic: No Neck, axilla, groin LAD  Psych: Oriented x 3, normal affect  Incisions:   Tubes:  Relevant labs, radiology and Medications reviewed            MEDICATIONS  (STANDING):  heparin  Injectable 5000 Unit(s) SubCutaneous every 8 hours  pantoprazole    Tablet 40 milliGRAM(s) Oral before breakfast  polyethylene glycol 3350 17 Gram(s) Oral daily  senna 2 Tablet(s) Oral at bedtime    MEDICATIONS  (PRN):  metoclopramide Injectable 10 milliGRAM(s) IV Push every 6 hours PRN Nausea & Vomiting  oxyCODONE    IR 5 milliGRAM(s) Oral every 3 hours PRN Severe Pain (7 - 10)      Physical exam  General: WN/WD NAD  Neurology: Awake, nonfocal, MENON x 4  Eyes: Scleras clear, PERRLA/ EOMI, Gross vision intact  ENT:Gross hearing intact, grossly patent pharynx, no stridor  Neck: Neck supple, trachea midline, No JVD,   Respiratory: CTA B/L, No wheezing, rales, rhonchi  CV: RRR, S1S2, no murmurs, rubs or gallops  Abdominal: Soft, NT, ND +BS,   Extremities: No edema, + peripheral pulses  Skin: No Rashes, Hematoma, Ecchymosis  Lymphatic: No Neck, axilla, groin LAD  Psych: Oriented x 3, normal affect  Incisions:   Tubes:    I&O's Summary    07 Nov 2019 07:01  -  08 Nov 2019 07:00  --------------------------------------------------------  IN: 200 mL / OUT: 290 mL / NET: -90 mL        Assessment    Assessment  73y Male  w/ PAST MEDICAL & SURGICAL HISTORY:  Solitary lung nodule: RUL  History of viral hepatitis, type B  History of ear, nose, and throat (ENT) surgery: nose surgery in [place]  admitted with complaints of Patient is a 73y old  Male who presents with a chief complaint of Lobectomy (05 Nov 2019 07:07)  HPI:  73 year old male with a history of hepatitis B was found to have enlarged lymph nodes and a solitary pulmonary right upper lung nodule and is s/p flexible bronchoscopy, mediastinoscopy, right video assisted thoracoscopy and robotic assisted right upper lobe wedge resection with completion lobectomy on 11/04/19. Per history the patient reported a dry cough since the beginning of the year. He denied fever, sputum, hemoptysis or sleep disturbance. The patient is former smoker, 1 PPD for 52 years, recently quit in May 2019. CT on 09/19/19 revealed a lung nodule in RUL. PET CT on 10/19/19 showed lymph node enlargement. Post op pt with air leak. 11/5-transferred to [facility], 11/6-Trial of waterseal 11/9 Keep chest tube on h20 seal.     PLAN  Neuro: Pain management  Pulm: Encourage coughing, deep breathing and use of incentive spirometry. Wean off supplemental oxygen as able. Daily CXR.   Cardio: Monitor telemetry/alarms  GI: Tolerating diet. Continue stool softeners.  Renal: monitor urine output, supplement electrolytes as needed  Vasc: Heparin SC/SCDs for DVT prophylaxis  Heme: Stable H/H. .   ID: Off antibiotics. Stable.  Therapy: OOB/ambulate  Tubes: Monitor Chest tube output and air leak. CT  to continue on waterseal, will fu w rpt. CXR in AM  Disposition: Aim to D/C to home once CT removed.  Likely tomorrow  Discussed with Cardiothoracic Team at AM rounds.

## 2019-11-09 PROCEDURE — 71045 X-RAY EXAM CHEST 1 VIEW: CPT | Mod: 26

## 2019-11-09 RX ADMIN — OXYCODONE HYDROCHLORIDE 5 MILLIGRAM(S): 5 TABLET ORAL at 07:39

## 2019-11-09 RX ADMIN — PANTOPRAZOLE SODIUM 40 MILLIGRAM(S): 20 TABLET, DELAYED RELEASE ORAL at 05:52

## 2019-11-09 RX ADMIN — MAGNESIUM HYDROXIDE 30 MILLILITER(S): 400 TABLET, CHEWABLE ORAL at 07:39

## 2019-11-09 RX ADMIN — OXYCODONE HYDROCHLORIDE 5 MILLIGRAM(S): 5 TABLET ORAL at 18:00

## 2019-11-09 RX ADMIN — HEPARIN SODIUM 5000 UNIT(S): 5000 INJECTION INTRAVENOUS; SUBCUTANEOUS at 21:26

## 2019-11-09 RX ADMIN — OXYCODONE HYDROCHLORIDE 5 MILLIGRAM(S): 5 TABLET ORAL at 17:27

## 2019-11-09 RX ADMIN — HEPARIN SODIUM 5000 UNIT(S): 5000 INJECTION INTRAVENOUS; SUBCUTANEOUS at 05:52

## 2019-11-09 RX ADMIN — OXYCODONE HYDROCHLORIDE 5 MILLIGRAM(S): 5 TABLET ORAL at 23:08

## 2019-11-09 RX ADMIN — POLYETHYLENE GLYCOL 3350 17 GRAM(S): 17 POWDER, FOR SOLUTION ORAL at 11:39

## 2019-11-09 RX ADMIN — OXYCODONE HYDROCHLORIDE 5 MILLIGRAM(S): 5 TABLET ORAL at 08:10

## 2019-11-09 RX ADMIN — HEPARIN SODIUM 5000 UNIT(S): 5000 INJECTION INTRAVENOUS; SUBCUTANEOUS at 13:14

## 2019-11-09 NOTE — PROGRESS NOTE ADULT - SUBJECTIVE AND OBJECTIVE BOX
Subjective: 72 y/o male presents sitting up in bed in NAD.    :     Vital Signs:  Vital Signs Last 24 Hrs  T(C): 36.8 (11-09-19 @ 05:38), Max: 37.1 (11-08-19 @ 16:32)  T(F): 98.3 (11-09-19 @ 05:38), Max: 98.7 (11-08-19 @ 16:32)  HR: 83 (11-09-19 @ 05:38) (71 - 87)  BP: 98/67 (11-09-19 @ 05:38) (98/67 - 132/78)  RR: 18 (11-09-19 @ 05:38) (18 - 18)  SpO2: 96% (11-09-19 @ 05:38) (95% - 98%) on (O2)    Pertinent Physical Exam:  Telemetry/Alarms: NSR  General: WN/WD NAD  Neurology: Awake, nonfocal, MENON x 4  Eyes: Scleras clear, PERRLA/ EOMI, Gross vision intact  ENT:Gross hearing intact, grossly patent pharynx, no stridor  Neck: Neck supple, trachea midline, No JVD,   Respiratory: CTA B/L, No wheezing, rales, rhonchi  CV: RRR, S1S2, no murmurs, rubs or gallops  Abdominal: Soft, NT, ND +BS,   Extremities: No edema, + peripheral pulses  Skin: No Rashes, Hematoma, Ecchymosis  Lymphatic: No Neck, axilla, groin LAD  Psych: Oriented x 3, normal affect  Incisions: c/d/i  Tubes: RCT     Chest Tube: Right Side       I&O's Summary    08 Nov 2019 07:01  -  09 Nov 2019 07:00  --------------------------------------------------------  IN: 0 mL / OUT: 1260 mL / NET: -1260 mL    09 Nov 2019 07:01  -  09 Nov 2019 11:29  --------------------------------------------------------  IN: 0 mL / OUT: 350 mL / NET: -350 mL        Relevant labs, radiology and Medications reviewed    CXR :  < from: Xray Chest 1 View- PORTABLE-Routine (11.08.19 @ 08:33) >    IMPRESSION:    No change in right pneumothorax.      < end of copied text >      MEDICATIONS  (STANDING):  heparin  Injectable 5000 Unit(s) SubCutaneous every 8 hours  pantoprazole    Tablet 40 milliGRAM(s) Oral before breakfast  polyethylene glycol 3350 17 Gram(s) Oral daily  senna 2 Tablet(s) Oral at bedtime    MEDICATIONS  (PRN):  magnesium hydroxide Suspension 30 milliLiter(s) Oral daily PRN Constipation  metoclopramide Injectable 10 milliGRAM(s) IV Push every 6 hours PRN Nausea & Vomiting  oxyCODONE    IR 5 milliGRAM(s) Oral every 3 hours PRN Severe Pain (7 - 10)      Assessment  73y Male  w/ PAST MEDICAL & SURGICAL HISTORY:  Solitary lung nodule: RUL  History of viral hepatitis, type B  History of ear, nose, and throat (ENT) surgery: nose surgery in [place]  admitted with complaints of Patient is a 73y old  Male who presents with a chief complaint of lung surgery (06 Nov 2019 12:26)    HPI:  73 year old male with a history of hepatitis B was found to have enlarged lymph nodes and a solitary pulmonary right upper lung nodule and is s/p flexible bronchoscopy, mediastinoscopy, right video assisted thoracoscopy and robotic assisted right upper lobe wedge resection with completion lobectomy on 11/04/19. Per history the patient reported a dry cough since the beginning of the year. He denied fever, sputum, hemoptysis or sleep disturbance. The patient is former smoker, 1 PPD for 52 years, recently quit in May 2019. CT on 09/19/19 revealed a lung nodule in RUL. PET CT on 10/19/19 showed lymph node enlargement. Post op pt with air leak. 11/5-transferred to [facility], 11/6-Trial of waterseal 11/9 Keep chest tube on h20 seal. 11/9 Persistent FEAL noted with stable right PTX on WS          PLAN  Neuro: Pain management  Pulm: Encourage coughing, deep breathing and use of incentive spirometry.Daily CXR.   Cardio: Monitor telemetry/alarms  GI: Tolerating diet. Continue stool softeners.  Renal: monitor urine output, supplement electrolytes as needed  Vasc: Heparin SC/SCDs for DVT prophylaxis  Heme: Stable H/H.   ID: Off antibiotics. Stable.  Therapy: OOB/ambulate  Tubes: Monitor Chest tube output and FEAL  Disposition: Aim to D/C to home once air leak is resolved and chest tube is removed  Discussed with Cardiothoracic Team at AM rounds. Subjective: 72 y/o male presents sitting up in bed in NAD.   Translated by daughter who was at bedside on 3rd encounter    Vital Signs:  Vital Signs Last 24 Hrs  T(C): 36.8 (11-09-19 @ 05:38), Max: 37.1 (11-08-19 @ 16:32)  T(F): 98.3 (11-09-19 @ 05:38), Max: 98.7 (11-08-19 @ 16:32)  HR: 83 (11-09-19 @ 05:38) (71 - 87)  BP: 98/67 (11-09-19 @ 05:38) (98/67 - 132/78)  RR: 18 (11-09-19 @ 05:38) (18 - 18)  SpO2: 96% (11-09-19 @ 05:38) (95% - 98%) on (O2)    Pertinent Physical Exam:  Telemetry/Alarms: NSR  General: WN/WD NAD  Neurology: Awake, nonfocal, MENON x 4  Eyes: Scleras clear, PERRLA/ EOMI, Gross vision intact  ENT:Gross hearing intact, grossly patent pharynx, no stridor  Neck: Neck supple, trachea midline, No JVD,   Respiratory: CTA B/L, No wheezing, rales, rhonchi  CV: RRR, S1S2, no murmurs, rubs or gallops  Abdominal: Soft, NT, ND +BS,   Extremities: No edema, + peripheral pulses  Skin: No Rashes, Hematoma, Ecchymosis  Lymphatic: No Neck, axilla, groin LAD  Psych: Oriented x 3, normal affect  Incisions: c/d/i  Tubes: RCT     Chest Tube: Right Side       I&O's Summary    08 Nov 2019 07:01  -  09 Nov 2019 07:00  --------------------------------------------------------  IN: 0 mL / OUT: 1260 mL / NET: -1260 mL    09 Nov 2019 07:01  -  09 Nov 2019 11:29  --------------------------------------------------------  IN: 0 mL / OUT: 350 mL / NET: -350 mL        Relevant labs, radiology and Medications reviewed    CXR :  < from: Xray Chest 1 View- PORTABLE-Routine (11.08.19 @ 08:33) >    IMPRESSION:    No change in right pneumothorax.      < end of copied text >      MEDICATIONS  (STANDING):  heparin  Injectable 5000 Unit(s) SubCutaneous every 8 hours  pantoprazole    Tablet 40 milliGRAM(s) Oral before breakfast  polyethylene glycol 3350 17 Gram(s) Oral daily  senna 2 Tablet(s) Oral at bedtime    MEDICATIONS  (PRN):  magnesium hydroxide Suspension 30 milliLiter(s) Oral daily PRN Constipation  metoclopramide Injectable 10 milliGRAM(s) IV Push every 6 hours PRN Nausea & Vomiting  oxyCODONE    IR 5 milliGRAM(s) Oral every 3 hours PRN Severe Pain (7 - 10)      Assessment  73y Male  w/ PAST MEDICAL & SURGICAL HISTORY:  Solitary lung nodule: RUL  History of viral hepatitis, type B  History of ear, nose, and throat (ENT) surgery: nose surgery in [place]  admitted with complaints of Patient is a 73y old  Male who presents with a chief complaint of lung surgery (06 Nov 2019 12:26)    HPI:  73 year old male with a history of hepatitis B was found to have enlarged lymph nodes and a solitary pulmonary right upper lung nodule and is s/p flexible bronchoscopy, mediastinoscopy, right video assisted thoracoscopy and robotic assisted right upper lobe wedge resection with completion lobectomy on 11/04/19. Per history the patient reported a dry cough since the beginning of the year. He denied fever, sputum, hemoptysis or sleep disturbance. The patient is former smoker, 1 PPD for 52 years, recently quit in May 2019. CT on 09/19/19 revealed a lung nodule in RUL. PET CT on 10/19/19 showed lymph node enlargement. Post op pt with air leak. 11/5-transferred to [facility], 11/6-Trial of waterseal 11/9 Keep chest tube on h20 seal. 11/9 Persistent FEAL noted with slight inc. in PTX, place to suction          PLAN  Neuro: Pain management  Pulm: Encourage coughing, deep breathing and use of incentive spirometry. Daily CXR.   Cardio: Monitor telemetry/alarms  GI: Tolerating diet. Continue stool softeners, constipated  Renal: monitor urine output, supplement electrolytes as needed  Vasc: Heparin SC/SCDs for DVT prophylaxis  Heme: Stable H/H.   ID: Off antibiotics. Stable.  Therapy: OOB/ambulate  Tubes: Monitor Chest tube output and FEAL, place ct to sxn  Disposition: Aim to D/C to home once air leak is resolved and chest tube is removed  Discussed with Cardiothoracic Team at AM rounds.

## 2019-11-10 PROCEDURE — 71045 X-RAY EXAM CHEST 1 VIEW: CPT | Mod: 26

## 2019-11-10 RX ADMIN — OXYCODONE HYDROCHLORIDE 5 MILLIGRAM(S): 5 TABLET ORAL at 21:33

## 2019-11-10 RX ADMIN — OXYCODONE HYDROCHLORIDE 5 MILLIGRAM(S): 5 TABLET ORAL at 00:00

## 2019-11-10 RX ADMIN — HEPARIN SODIUM 5000 UNIT(S): 5000 INJECTION INTRAVENOUS; SUBCUTANEOUS at 05:12

## 2019-11-10 RX ADMIN — OXYCODONE HYDROCHLORIDE 5 MILLIGRAM(S): 5 TABLET ORAL at 13:46

## 2019-11-10 RX ADMIN — OXYCODONE HYDROCHLORIDE 5 MILLIGRAM(S): 5 TABLET ORAL at 22:09

## 2019-11-10 RX ADMIN — HEPARIN SODIUM 5000 UNIT(S): 5000 INJECTION INTRAVENOUS; SUBCUTANEOUS at 12:13

## 2019-11-10 RX ADMIN — HEPARIN SODIUM 5000 UNIT(S): 5000 INJECTION INTRAVENOUS; SUBCUTANEOUS at 21:30

## 2019-11-10 RX ADMIN — OXYCODONE HYDROCHLORIDE 5 MILLIGRAM(S): 5 TABLET ORAL at 14:20

## 2019-11-10 NOTE — PROGRESS NOTE ADULT - SUBJECTIVE AND OBJECTIVE BOX
Subjective: 74 y/o male presents sitting up in bed in NAD. No acute events overnight. ambulating in room with CT back to suction since yesterday. Denies sob or chest pain.   Translated by Son in Law who was present in room.    Vital Signs:  Vital Signs Last 24 Hrs  T(C): 36.7 (11-10-19 @ 08:59), Max: 36.9 (11-09-19 @ 16:05)  T(F): 98.1 (11-10-19 @ 08:59), Max: 98.4 (11-09-19 @ 16:05)  HR: 84 (11-10-19 @ 08:59) (75 - 91)  BP: 96/76 (11-10-19 @ 08:59) (96/76 - 123/71)  RR: 18 (11-10-19 @ 08:59) (17 - 18)  SpO2: 100% (11-10-19 @ 08:59) (96% - 100%) on (O2)    Pertinent Physical Exam:  Telemetry/Alarms: NSR  General: WN/WD NAD  Neurology: Awake, nonfocal, MENON x 4  Eyes: Scleras clear, PERRLA/ EOMI, Gross vision intact  ENT:Gross hearing intact, grossly patent pharynx, no stridor  Neck: Neck supple, trachea midline, No JVD,   Respiratory: CTA B/L, No wheezing, rales, rhonchi  CV: RRR, S1S2, no murmurs, rubs or gallops  Abdominal: Soft, NT, ND +BS,   Extremities: No edema, + peripheral pulses  Skin: No Rashes, Hematoma, Ecchymosis  Lymphatic: No Neck, axilla, groin LAD  Psych: Oriented x 3, normal affect  Incisions: c/d/i  Tubes: Right CT in place    Chest Tube: Right Side  Air Leak: Yes[x] / No[]    Drainage: 100cc    I&O's Summary    09 Nov 2019 07:01  -  10 Nov 2019 07:00  --------------------------------------------------------  IN: 0 mL / OUT: 1750 mL / NET: -1750 mL        Relevant labs, radiology and Medications reviewed      CXR:   < from: Xray Chest 1 View- PORTABLE-Routine (11.08.19 @ 08:33) >  IMPRESSION:    No change in right pneumothorax.    < end of copied text >    CXR: 11/9  Increase in Right apical PTX with chest tube on water seal- Pending official report    CXR: 11/10  Improved Right apical PTX with chest tube on suction - Pending official report    MEDICATIONS  (STANDING):  heparin  Injectable 5000 Unit(s) SubCutaneous every 8 hours  pantoprazole    Tablet 40 milliGRAM(s) Oral before breakfast  polyethylene glycol 3350 17 Gram(s) Oral daily    MEDICATIONS  (PRN):  magnesium hydroxide Suspension 30 milliLiter(s) Oral daily PRN Constipation  metoclopramide Injectable 10 milliGRAM(s) IV Push every 6 hours PRN Nausea & Vomiting  oxyCODONE    IR 5 milliGRAM(s) Oral every 3 hours PRN Severe Pain (7 - 10)      Assessment  73y Male  w/ PAST MEDICAL & SURGICAL HISTORY:  Solitary lung nodule: RUL  History of viral hepatitis, type B  History of ear, nose, and throat (ENT) surgery: nose surgery in [place]  admitted with complaints of Patient is a 73y old  Male who presents with a chief complaint of lung surgery (06 Nov 2019 12:26)      HPI:  73 year old male with a history of hepatitis B was found to have enlarged lymph nodes and a solitary pulmonary right upper lung nodule and is s/p flexible bronchoscopy, mediastinoscopy, right video assisted thoracoscopy and robotic assisted right upper lobe wedge resection with completion lobectomy on 11/04/19. Per history the patient reported a dry cough since the beginning of the year. He denied fever, sputum, hemoptysis or sleep disturbance. The patient is former smoker, 1 PPD for 52 years, recently quit in May 2019. CT on 09/19/19 revealed a lung nodule in RUL. PET CT on 10/19/19 showed lymph node enlargement. Post op pt with air leak. 11/5-transferred to [facility], 11/6-Trial of waterseal 11/9 Keep chest tube on h20 seal.   11/9 Persistent FEAL noted with slight inc. in PTX, place to suction  11/10 Improved PTX, WS trial        PLAN  Neuro: Pain management with PO pain meds as needed  Pulm: Encourage coughing, deep breathing and use of incentive spirometry. Daily CXR.   Cardio: Monitor telemetry/alarms  GI: Tolerating diet. Pt admits to BM after bowel regime for constipation. Continue with miralax and milk of magnesia as needed.  Renal: monitor urine output, supplement electrolytes as needed  Vasc: Heparin SC/SCDs for DVT prophylaxis  Heme: Stable H/H.   ID: Off antibiotics. Stable.  Therapy: OOB/ambulate  Tubes: Monitor Chest tube output. Chest tube to WS for trial today  Disposition: Aim to D/C to home once chest tube air leak is resolved and chest tube removed  Discussed with Cardiothoracic Team at AM rounds. Subjective: 74 y/o male presents sitting up in bed in NAD. No acute events overnight. ambulating in room with CT back to suction since yesterday. Denies sob or chest pain.   Translated by Son in Law who was present in room.    Vital Signs:  Vital Signs Last 24 Hrs  T(C): 36.7 (11-10-19 @ 08:59), Max: 36.9 (11-09-19 @ 16:05)  T(F): 98.1 (11-10-19 @ 08:59), Max: 98.4 (11-09-19 @ 16:05)  HR: 84 (11-10-19 @ 08:59) (75 - 91)  BP: 96/76 (11-10-19 @ 08:59) (96/76 - 123/71)  RR: 18 (11-10-19 @ 08:59) (17 - 18)  SpO2: 100% (11-10-19 @ 08:59) (96% - 100%) on (O2)    Pertinent Physical Exam:  Telemetry/Alarms: NSR  General: WN/WD NAD  Neurology: Awake, nonfocal, MENON x 4  Eyes: Scleras clear, PERRLA/ EOMI, Gross vision intact  ENT:Gross hearing intact, grossly patent pharynx, no stridor  Neck: Neck supple, trachea midline, No JVD,   Respiratory: CTA B/L, No wheezing, rales, rhonchi  CV: RRR, S1S2, no murmurs, rubs or gallops  Abdominal: Soft, NT, ND +BS,   Extremities: No edema, + peripheral pulses  Skin: No Rashes, Hematoma, Ecchymosis  Lymphatic: No Neck, axilla, groin LAD  Psych: Oriented x 3, normal affect  Incisions: c/d/i  Tubes: Right CT in place    Chest Tube: Right Side  Air Leak: Yes[x] / No[]    Drainage: 100cc    I&O's Summary    09 Nov 2019 07:01  -  10 Nov 2019 07:00  --------------------------------------------------------  IN: 0 mL / OUT: 1750 mL / NET: -1750 mL        Relevant labs, radiology and Medications reviewed      CXR:   < from: Xray Chest 1 View- PORTABLE-Routine (11.08.19 @ 08:33) >  IMPRESSION:    No change in right pneumothorax.    < end of copied text >    CXR: 11/9  Increase in Right apical PTX with chest tube on water seal- Pending official report    CXR: 11/10  Improved Right apical PTX with chest tube on suction - Pending official report    MEDICATIONS  (STANDING):  heparin  Injectable 5000 Unit(s) SubCutaneous every 8 hours  pantoprazole    Tablet 40 milliGRAM(s) Oral before breakfast  polyethylene glycol 3350 17 Gram(s) Oral daily    MEDICATIONS  (PRN):  magnesium hydroxide Suspension 30 milliLiter(s) Oral daily PRN Constipation  metoclopramide Injectable 10 milliGRAM(s) IV Push every 6 hours PRN Nausea & Vomiting  oxyCODONE    IR 5 milliGRAM(s) Oral every 3 hours PRN Severe Pain (7 - 10)      Assessment  73y Male  w/ PAST MEDICAL & SURGICAL HISTORY:  Solitary lung nodule: RUL  History of viral hepatitis, type B  History of ear, nose, and throat (ENT) surgery: nose surgery in [place]  admitted with complaints of Patient is a 73y old  Male who presents with a chief complaint of lung surgery (06 Nov 2019 12:26)      HPI:  73 year old male with a history of hepatitis B was found to have enlarged lymph nodes and a solitary pulmonary right upper lung nodule and is s/p flexible bronchoscopy, mediastinoscopy, right video assisted thoracoscopy and robotic assisted right upper lobe wedge resection with completion lobectomy on 11/04/19. Per history the patient reported a dry cough since the beginning of the year. He denied fever, sputum, hemoptysis or sleep disturbance. The patient is former smoker, 1 PPD for 52 years, recently quit in May 2019. CT on 09/19/19 revealed a lung nodule in RUL. PET CT on 10/19/19 showed lymph node enlargement. Post op pt with air leak. 11/5-transferred to [facility], 11/6-Trial of waterseal 11/9 Keep chest tube on h20 seal.   11/9 Persistent FEAL noted with slight inc. in PTX, place to suction  11/10 Improved PTX, inc. sxn to -40        PLAN  Neuro: Pain management with PO pain meds as needed  Pulm: Encourage coughing, deep breathing and use of incentive spirometry. Daily CXR.   Cardio: Monitor telemetry/alarms  GI: Tolerating diet. Pt admits to BM after bowel regime for constipation. Continue with miralax and milk of magnesia as needed.  Renal: monitor urine output, supplement electrolytes as needed  Vasc: Heparin SC/SCDs for DVT prophylaxis  Heme: Stable H/H.   ID: Off antibiotics. Stable.  Therapy: OOB/ambulate  Tubes: Monitor Chest tube output. Chest tube to -40 sxn  Disposition: consider Doxy pleurodesis tomorrow if air leak not resolved  Discussed with Cardiothoracic Team at AM rounds.

## 2019-11-11 PROCEDURE — 71045 X-RAY EXAM CHEST 1 VIEW: CPT | Mod: 26,77

## 2019-11-11 PROCEDURE — 71045 X-RAY EXAM CHEST 1 VIEW: CPT | Mod: 26

## 2019-11-11 RX ORDER — HYDROMORPHONE HYDROCHLORIDE 2 MG/ML
0.5 INJECTION INTRAMUSCULAR; INTRAVENOUS; SUBCUTANEOUS ONCE
Refills: 0 | Status: DISCONTINUED | OUTPATIENT
Start: 2019-11-11 | End: 2019-11-11

## 2019-11-11 RX ORDER — ACETAMINOPHEN 500 MG
1000 TABLET ORAL ONCE
Refills: 0 | Status: COMPLETED | OUTPATIENT
Start: 2019-11-11 | End: 2019-11-11

## 2019-11-11 RX ADMIN — HYDROMORPHONE HYDROCHLORIDE 0.5 MILLIGRAM(S): 2 INJECTION INTRAMUSCULAR; INTRAVENOUS; SUBCUTANEOUS at 16:03

## 2019-11-11 RX ADMIN — OXYCODONE HYDROCHLORIDE 5 MILLIGRAM(S): 5 TABLET ORAL at 07:30

## 2019-11-11 RX ADMIN — OXYCODONE HYDROCHLORIDE 5 MILLIGRAM(S): 5 TABLET ORAL at 21:10

## 2019-11-11 RX ADMIN — Medication 400 MILLIGRAM(S): at 16:04

## 2019-11-11 RX ADMIN — HYDROMORPHONE HYDROCHLORIDE 0.5 MILLIGRAM(S): 2 INJECTION INTRAMUSCULAR; INTRAVENOUS; SUBCUTANEOUS at 16:25

## 2019-11-11 RX ADMIN — HYDROMORPHONE HYDROCHLORIDE 0.5 MILLIGRAM(S): 2 INJECTION INTRAMUSCULAR; INTRAVENOUS; SUBCUTANEOUS at 15:45

## 2019-11-11 RX ADMIN — HEPARIN SODIUM 5000 UNIT(S): 5000 INJECTION INTRAVENOUS; SUBCUTANEOUS at 21:06

## 2019-11-11 RX ADMIN — HEPARIN SODIUM 5000 UNIT(S): 5000 INJECTION INTRAVENOUS; SUBCUTANEOUS at 13:09

## 2019-11-11 RX ADMIN — OXYCODONE HYDROCHLORIDE 5 MILLIGRAM(S): 5 TABLET ORAL at 20:14

## 2019-11-11 RX ADMIN — PANTOPRAZOLE SODIUM 40 MILLIGRAM(S): 20 TABLET, DELAYED RELEASE ORAL at 05:00

## 2019-11-11 RX ADMIN — HEPARIN SODIUM 5000 UNIT(S): 5000 INJECTION INTRAVENOUS; SUBCUTANEOUS at 05:01

## 2019-11-11 RX ADMIN — OXYCODONE HYDROCHLORIDE 5 MILLIGRAM(S): 5 TABLET ORAL at 06:50

## 2019-11-11 RX ADMIN — Medication 1000 MILLIGRAM(S): at 16:25

## 2019-11-11 NOTE — DIETITIAN INITIAL EVALUATION ADULT. - OTHER INFO
RD visited with patient for LOS day 7.  Patient is s/p 73 year old male with a history of hepatitis B was found to have enlarged lymph nodes and a solitary pulmonary right upper lung nodule and is s/p flexible bronchoscopy, mediastinoscopy, right video assisted thoracoscopy and robotic assisted right upper lobe wedge resection with completion lobectomy on 11/04/19.    RD obtained  services in [facility] - ID# [id number].  Patient's spouse present and participated in diet interview due to patient being in pain at present time.      Spouse reported she is bringing home cooked foods (Chinese cuisine).  No reported chewing/swallowing difficulties reported.  No food allergies noted.  Offered oral supplement i.e. Ensure Enlive - was amenable to receiving to optimize nutrition.  Reported usual body weight  pounds.  Current weight 11/11/19 - 60.7kg (133.54 pounds).  Admit weight 11/4/19 - 62.36kg; weight trend: 11/5/19 - 63.3kg, 11/6/19 63.8kg,  11/9/19 63.1kg. Weight loss of 1.9kg since 11/4/19 / 3.0%.  No edema noted. No pressure injuries present.

## 2019-11-11 NOTE — DIETITIAN INITIAL EVALUATION ADULT. - ADD RECOMMEND
1) Monitor weights, PO intake/diet tolerance, skin integrity, pertinent labs per protocol.  2) Honor food preferences as able.  3) Please provide assistance with meals as needed.

## 2019-11-11 NOTE — DIETITIAN INITIAL EVALUATION ADULT. - PERTINENT MEDS FT
MEDICATIONS  (STANDING):  doxycycline Intrapleural Syringe 500 milliGRAM(s) IntraPleural. once  heparin  Injectable 5000 Unit(s) SubCutaneous every 8 hours  pantoprazole    Tablet 40 milliGRAM(s) Oral before breakfast  polyethylene glycol 3350 17 Gram(s) Oral daily    MEDICATIONS  (PRN):  magnesium hydroxide Suspension 30 milliLiter(s) Oral daily PRN Constipation  metoclopramide Injectable 10 milliGRAM(s) IV Push every 6 hours PRN Nausea & Vomiting  oxyCODONE    IR 5 milliGRAM(s) Oral every 3 hours PRN Severe Pain (7 - 10)

## 2019-11-11 NOTE — CHART NOTE - NSCHARTNOTEFT_GEN_A_CORE
NUTRITION SERVICES     Upon Nutritional Assessment by the Registered Dietitian your patient was determined to meet criteria/ has evidence of the following diagnosis/diagnoses:  [ ] Mild Protein Calorie Malnutrition   [X] Moderate Protein Calorie Malnutrition   [ ] Severe Protein Calorie Malnutrition   [ ] Unspecified Protein Calorie Malnutrition   [ ] Underweight / BMI <19  [ ] Morbid Obesity / BMI >40    Findings as based on:  •  Comprehensive nutritional assessment and consultation    Please refer to Initial Dietitian Evaluation via documents section of [employer] EMR for further recommendations.    Marii Montejo, MS, RDN, CDN

## 2019-11-11 NOTE — DIETITIAN INITIAL EVALUATION ADULT. - CONTINUE CURRENT NUTRITION CARE PLAN
yes/Suggest adding Ensure Enlive 240mls 2x daily (700kcal, 40g protein) to optimize nutritional intake.

## 2019-11-11 NOTE — PROGRESS NOTE ADULT - SUBJECTIVE AND OBJECTIVE BOX
Subjective: Patient denies any chest pains, SOB, or dizziness at this time.    Vital Signs:  Vital Signs Last 24 Hrs  T(C): 36.7 (11-11-19 @ 17:15), Max: 36.9 (11-10-19 @ 21:31)  T(F): 98 (11-11-19 @ 17:15), Max: 98.4 (11-10-19 @ 21:31)  HR: 102 (11-11-19 @ 17:15) (79 - 102)  BP: 166/94 (11-11-19 @ 17:15) (95/65 - 166/94)  RR: 17 (11-11-19 @ 17:15) (15 - 18)  SpO2: 96% (11-11-19 @ 17:15) (96% - 98%) on (O2)    General: WN/WD NAD  Neurology: Awake, nonfocal, MENON x 4  Eyes: Scleras clear, PERRLA/ EOMI, Gross vision intact  ENT: Gross hearing intact, grossly patent pharynx, no stridor  Neck: Neck supple, trachea midline, No JVD  Respiratory: CTA B/L, No wheezing, rales, rhonchi  CV: RRR, S1S2, no murmurs, rubs or gallops  Abdominal: Soft, NT, ND  Extremities: No edema, + peripheral pulses  Skin: No Rashes, Hematoma, Ecchymosis  Lymphatic: No Neck, axilla, groin LAD  Psych: Oriented x 3, normal affect  Incisions: right chest incisions C/D/I  Tubes: 1 right pleural chest tube on suction      MEDICATIONS  (STANDING):  doxycycline Intrapleural Syringe 500 milliGRAM(s) IntraPleural. once  heparin  Injectable 5000 Unit(s) SubCutaneous every 8 hours  pantoprazole    Tablet 40 milliGRAM(s) Oral before breakfast  polyethylene glycol 3350 17 Gram(s) Oral daily    MEDICATIONS  (PRN):  magnesium hydroxide Suspension 30 milliLiter(s) Oral daily PRN Constipation  metoclopramide Injectable 10 milliGRAM(s) IV Push every 6 hours PRN Nausea & Vomiting  oxyCODONE    IR 5 milliGRAM(s) Oral every 3 hours PRN Severe Pain (7 - 10)      Assessment  73y Male  s/p Robotic-Assisted Right VATS, Right upper Lobe Wedge Resection, Completion RUL Lobectomy on 11/4/19    PLAN  Doxycycline Pleurodesis performed today at bedside via right pleural chest tube; Chest tube now on suction  Neuro: Pain management  Pulm: Encourage coughing, deep breathing and use of incentive spirometry. Wean off supplemental oxygen as able. Daily CXR.   Cardio: Monitor telemetry/alarms  GI: Tolerating diet. Continue stool softeners.  Renal: Monitor urine output, supplement electrolytes as needed  Vasc: Heparin SC/SCDs for DVT prophylaxis  Heme: Stable H/H.  ID: Off antibiotics. Stable.  Therapy: OOB/ambulate    Seen and discussed at bedside with Dr. Bill James PAAARON  Thoracic Surgery   #[id number]

## 2019-11-11 NOTE — PROCEDURE NOTE - GENERAL PROCEDURE DETAILS
Injected 500ml of Doxycycline intrapleurally. PleuroVac placed at higher level than patient for 2 hours

## 2019-11-11 NOTE — DIETITIAN INITIAL EVALUATION ADULT. - PHYSICAL APPEARANCE
other (specify)/debilitated Unable to perform fully due to pain/discomfort  Nutrition focused physical exam conducted - found signs of malnutrition [X]present     Subcutaneous fat loss: [ ] Orbital fat pads region, [ ]Buccal fat region, [ ]Triceps region  Muscle wasting: [MILD]Clavicle region, [MILD]thigh region, [MILD ]Calf region

## 2019-11-12 ENCOUNTER — TRANSCRIPTION ENCOUNTER (OUTPATIENT)
Age: 74
End: 2019-11-12

## 2019-11-12 VITALS
TEMPERATURE: 99 F | HEART RATE: 89 BPM | SYSTOLIC BLOOD PRESSURE: 108 MMHG | RESPIRATION RATE: 16 BRPM | OXYGEN SATURATION: 97 % | DIASTOLIC BLOOD PRESSURE: 59 MMHG

## 2019-11-12 PROCEDURE — 71045 X-RAY EXAM CHEST 1 VIEW: CPT | Mod: 26,77

## 2019-11-12 PROCEDURE — 71045 X-RAY EXAM CHEST 1 VIEW: CPT | Mod: 26,76

## 2019-11-12 RX ORDER — OXYCODONE HYDROCHLORIDE 5 MG/1
5 TABLET ORAL EVERY 4 HOURS
Refills: 0 | Status: DISCONTINUED | OUTPATIENT
Start: 2019-11-12 | End: 2019-11-12

## 2019-11-12 RX ORDER — OXYCODONE HYDROCHLORIDE 5 MG/1
10 TABLET ORAL EVERY 6 HOURS
Refills: 0 | Status: DISCONTINUED | OUTPATIENT
Start: 2019-11-12 | End: 2019-11-12

## 2019-11-12 RX ORDER — KETOROLAC TROMETHAMINE 30 MG/ML
15 SYRINGE (ML) INJECTION ONCE
Refills: 0 | Status: DISCONTINUED | OUTPATIENT
Start: 2019-11-12 | End: 2019-11-12

## 2019-11-12 RX ORDER — OXYCODONE HYDROCHLORIDE 5 MG/1
1 TABLET ORAL
Qty: 12 | Refills: 0
Start: 2019-11-12 | End: 2019-11-14

## 2019-11-12 RX ADMIN — OXYCODONE HYDROCHLORIDE 5 MILLIGRAM(S): 5 TABLET ORAL at 15:10

## 2019-11-12 RX ADMIN — Medication 15 MILLIGRAM(S): at 11:54

## 2019-11-12 RX ADMIN — OXYCODONE HYDROCHLORIDE 10 MILLIGRAM(S): 5 TABLET ORAL at 12:00

## 2019-11-12 RX ADMIN — OXYCODONE HYDROCHLORIDE 5 MILLIGRAM(S): 5 TABLET ORAL at 01:30

## 2019-11-12 RX ADMIN — Medication 15 MILLIGRAM(S): at 11:24

## 2019-11-12 RX ADMIN — OXYCODONE HYDROCHLORIDE 5 MILLIGRAM(S): 5 TABLET ORAL at 15:40

## 2019-11-12 RX ADMIN — OXYCODONE HYDROCHLORIDE 5 MILLIGRAM(S): 5 TABLET ORAL at 00:49

## 2019-11-12 RX ADMIN — OXYCODONE HYDROCHLORIDE 10 MILLIGRAM(S): 5 TABLET ORAL at 11:24

## 2019-11-12 RX ADMIN — HEPARIN SODIUM 5000 UNIT(S): 5000 INJECTION INTRAVENOUS; SUBCUTANEOUS at 15:06

## 2019-11-12 RX ADMIN — HEPARIN SODIUM 5000 UNIT(S): 5000 INJECTION INTRAVENOUS; SUBCUTANEOUS at 05:16

## 2019-11-12 RX ADMIN — PANTOPRAZOLE SODIUM 40 MILLIGRAM(S): 20 TABLET, DELAYED RELEASE ORAL at 05:16

## 2019-11-12 NOTE — DISCHARGE NOTE PROVIDER - NSDCFUSCHEDAPPT_GEN_ALL_CORE_FT
GÓMEZ RODRIGUEZ ; 11/21/2019 ; [facility] [id number] GÓMEZ RODRIGUEZ ; 11/21/2019 ; [facility] [address]

## 2019-11-12 NOTE — DISCHARGE NOTE PROVIDER - CARE PROVIDER_API CALL
Lon Khan (MD)  Surgery; Thoracic Surgery  [id number] [place] [address]  Phone: [phone]  Fax: [phone]  Follow Up Time:

## 2019-11-12 NOTE — DISCHARGE NOTE NURSING/CASE MANAGEMENT/SOCIAL WORK - NSDCPEEMAIL_GEN_ALL_CORE
[facility] Control email [email or web address]

## 2019-11-12 NOTE — DISCHARGE NOTE PROVIDER - NSDCCPCAREPLAN_GEN_ALL_CORE_FT
PRINCIPAL DISCHARGE DIAGNOSIS  Diagnosis: Lung nodule  Assessment and Plan of Treatment: s/p lung surgery via robotic assisted lobectomy

## 2019-11-12 NOTE — DISCHARGE NOTE PROVIDER - NSDCFUADDINST_GEN_ALL_CORE_FT
- Leave dressing intact for 24 hrs by reinforcing with tape if necessary. At that time you may remove the dressing and take a shower. Place clean gauze over wound if continual drainage. Continue with daily ambulation and use of incentive spirometer. (The suture will be removed in the office).  - Call the office if you experience any fevers, shortness of breath, chest pain or excessive or purulent drainage from the incision site, leg swelling day or night. Go to the emergency room if any of these symptoms are severe.   - Take your medications as ordered and take a stool softener if needed with the narcotic medications.  - Call Dr. Lon Khan's office at [phone] tomorrow or the next business day to make a followup appointment.  - Please get an CXR the day before your appointment and bring it with you to your follow up appointment.

## 2019-11-12 NOTE — DISCHARGE NOTE NURSING/CASE MANAGEMENT/SOCIAL WORK - PATIENT PORTAL LINK FT
You can access the FollowMyHealth Patient Portal offered by [facility] by registering at the following website: [email or web address]. By joining [email or web address]’s FollowMyHealth portal, you will also be able to view your health information using other applications (apps) compatible with our system. 19

## 2019-11-12 NOTE — DISCHARGE NOTE PROVIDER - HOSPITAL COURSE
73 year old male who is a former smoker, 1 PPD for 52 years, recently quit in May 2019, with a history of hepatitis B and dry cough for a few months was found to have enlarged lymph nodes and a solitary pulmonary right upper lung nodule now s/p flexible bronchoscopy, mediastinoscopy, right video assisted thoracoscopy and robotic assisted right upper lobe wedge resection with completion lobectomy on 11/04/19. Post op course complicated by an air leak. Pt failed a water seal trial on 11/6 with expanding ptx requiring chest tube to be placed back to suction. On 11/11 patient underwent bedside Doxy Pleurodesis via right chest tube with resolution of air leak and improvement of ptx on CXR. On 11/12 chest tube was removed follow up CXR reviewed which shows a stable right apical ptx. May proceed with discharge per Dr. Lon Khan.

## 2019-11-12 NOTE — DISCHARGE NOTE PROVIDER - NSDCCPTREATMENT_GEN_ALL_CORE_FT
PRINCIPAL PROCEDURE  Procedure: Robotic assisted procedure, thoracoscopic  Findings and Treatment: Recover from surgery

## 2019-11-12 NOTE — DISCHARGE NOTE PROVIDER - NSDCACTIVITY_GEN_ALL_CORE
Showering allowed/Stairs allowed/Walking - Outdoors allowed/No heavy lifting/straining/Walking - Indoors allowed/Do not drive or operate machinery/Do not make important decisions

## 2019-11-12 NOTE — DISCHARGE NOTE PROVIDER - NSDCMRMEDTOKEN_GEN_ALL_CORE_FT
Fish Oil oral capsule: 1 cap(s) orally once a day  Multiple Vitamins oral capsule: 1 cap(s) orally once a day  oxyCODONE 5 mg oral tablet: 1 tab(s) orally every 6 hours, As Needed -Severe Pain MDD:4

## 2019-11-13 ENCOUNTER — MEDICATION RENEWAL (OUTPATIENT)
Age: 74
End: 2019-11-13

## 2019-11-13 DIAGNOSIS — G89.18 OTHER ACUTE POSTPROCEDURAL PAIN: ICD-10-CM

## 2019-11-20 ENCOUNTER — FORM ENCOUNTER (OUTPATIENT)
Age: 74
End: 2019-11-20

## 2019-11-21 ENCOUNTER — APPOINTMENT (OUTPATIENT)
Dept: RADIOLOGY | Facility: HOSPITAL | Age: 74
End: 2019-11-21

## 2019-11-21 ENCOUNTER — APPOINTMENT (OUTPATIENT)
Dept: THORACIC SURGERY | Facility: CLINIC | Age: 74
End: 2019-11-21
Payer: MEDICARE

## 2019-11-21 ENCOUNTER — OUTPATIENT (OUTPATIENT)
Dept: OUTPATIENT SERVICES | Facility: HOSPITAL | Age: 74
LOS: 1 days | End: 2019-11-21
Payer: MEDICARE

## 2019-11-21 VITALS
SYSTOLIC BLOOD PRESSURE: 100 MMHG | HEART RATE: 76 BPM | OXYGEN SATURATION: 96 % | WEIGHT: 136.5 LBS | BODY MASS INDEX: 21.38 KG/M2 | TEMPERATURE: 98.7 F | RESPIRATION RATE: 16 BRPM | DIASTOLIC BLOOD PRESSURE: 69 MMHG

## 2019-11-21 DIAGNOSIS — Z98.890 OTHER SPECIFIED POSTPROCEDURAL STATES: Chronic | ICD-10-CM

## 2019-11-21 DIAGNOSIS — R91.1 SOLITARY PULMONARY NODULE: ICD-10-CM

## 2019-11-21 PROCEDURE — 99024 POSTOP FOLLOW-UP VISIT: CPT

## 2019-11-21 PROCEDURE — 71046 X-RAY EXAM CHEST 2 VIEWS: CPT | Mod: 26

## 2019-12-09 ENCOUNTER — EMERGENCY (EMERGENCY)
Facility: HOSPITAL | Age: 74
LOS: 1 days | Discharge: HOME | End: 2019-12-09
Attending: EMERGENCY MEDICINE | Admitting: EMERGENCY MEDICINE
Payer: MEDICARE

## 2019-12-09 ENCOUNTER — EMERGENCY (EMERGENCY)
Facility: HOSPITAL | Age: 74
LOS: 1 days | Discharge: LEFT BEFORE DISCHARGE | End: 2019-12-09
Admitting: EMERGENCY MEDICINE

## 2019-12-09 VITALS
SYSTOLIC BLOOD PRESSURE: 122 MMHG | DIASTOLIC BLOOD PRESSURE: 74 MMHG | RESPIRATION RATE: 16 BRPM | HEART RATE: 66 BPM | OXYGEN SATURATION: 100 %

## 2019-12-09 VITALS
RESPIRATION RATE: 17 BRPM | SYSTOLIC BLOOD PRESSURE: 119 MMHG | HEART RATE: 70 BPM | TEMPERATURE: 98 F | DIASTOLIC BLOOD PRESSURE: 75 MMHG | OXYGEN SATURATION: 97 %

## 2019-12-09 DIAGNOSIS — Z98.890 OTHER SPECIFIED POSTPROCEDURAL STATES: Chronic | ICD-10-CM

## 2019-12-09 DIAGNOSIS — Z90.2 ACQUIRED ABSENCE OF LUNG [PART OF]: Chronic | ICD-10-CM

## 2019-12-09 LAB
ALBUMIN SERPL ELPH-MCNC: 4.4 G/DL — SIGNIFICANT CHANGE UP (ref 3.3–5)
ALP SERPL-CCNC: 82 U/L — SIGNIFICANT CHANGE UP (ref 40–120)
ALT FLD-CCNC: 19 U/L — SIGNIFICANT CHANGE UP (ref 4–41)
ANION GAP SERPL CALC-SCNC: 10 MMO/L — SIGNIFICANT CHANGE UP (ref 7–14)
APTT BLD: 33.8 SEC — SIGNIFICANT CHANGE UP (ref 27.5–36.3)
AST SERPL-CCNC: 23 U/L — SIGNIFICANT CHANGE UP (ref 4–40)
BASE EXCESS BLDV CALC-SCNC: 3.7 MMOL/L — SIGNIFICANT CHANGE UP
BASOPHILS # BLD AUTO: 0.05 K/UL — SIGNIFICANT CHANGE UP (ref 0–0.2)
BASOPHILS NFR BLD AUTO: 0.5 % — SIGNIFICANT CHANGE UP (ref 0–2)
BILIRUB SERPL-MCNC: 0.3 MG/DL — SIGNIFICANT CHANGE UP (ref 0.2–1.2)
BLOOD GAS VENOUS - CREATININE: 0.9 MG/DL — SIGNIFICANT CHANGE UP (ref 0.5–1.3)
BLOOD GAS VENOUS - FIO2: 21 — SIGNIFICANT CHANGE UP
BUN SERPL-MCNC: 16 MG/DL — SIGNIFICANT CHANGE UP (ref 7–23)
CALCIUM SERPL-MCNC: 9.4 MG/DL — SIGNIFICANT CHANGE UP (ref 8.4–10.5)
CHLORIDE BLDV-SCNC: 105 MMOL/L — SIGNIFICANT CHANGE UP (ref 96–108)
CHLORIDE SERPL-SCNC: 101 MMOL/L — SIGNIFICANT CHANGE UP (ref 98–107)
CO2 SERPL-SCNC: 25 MMOL/L — SIGNIFICANT CHANGE UP (ref 22–31)
CREAT SERPL-MCNC: 0.87 MG/DL — SIGNIFICANT CHANGE UP (ref 0.5–1.3)
EOSINOPHIL # BLD AUTO: 0.28 K/UL — SIGNIFICANT CHANGE UP (ref 0–0.5)
EOSINOPHIL NFR BLD AUTO: 2.9 % — SIGNIFICANT CHANGE UP (ref 0–6)
GAS PNL BLDV: 136 MMOL/L — SIGNIFICANT CHANGE UP (ref 136–146)
GLUCOSE BLDV-MCNC: 143 MG/DL — HIGH (ref 70–99)
GLUCOSE SERPL-MCNC: 153 MG/DL — HIGH (ref 70–99)
HCO3 BLDV-SCNC: 25 MMOL/L — SIGNIFICANT CHANGE UP (ref 20–27)
HCT VFR BLD CALC: 40.5 % — SIGNIFICANT CHANGE UP (ref 39–50)
HCT VFR BLDV CALC: 41.7 % — SIGNIFICANT CHANGE UP (ref 39–51)
HGB BLD-MCNC: 13.8 G/DL — SIGNIFICANT CHANGE UP (ref 13–17)
HGB BLDV-MCNC: 13.6 G/DL — SIGNIFICANT CHANGE UP (ref 13–17)
IMM GRANULOCYTES NFR BLD AUTO: 0.3 % — SIGNIFICANT CHANGE UP (ref 0–1.5)
INR BLD: 1.02 — SIGNIFICANT CHANGE UP (ref 0.88–1.17)
LACTATE BLDV-MCNC: 1 MMOL/L — SIGNIFICANT CHANGE UP (ref 0.5–2)
LYMPHOCYTES # BLD AUTO: 1.12 K/UL — SIGNIFICANT CHANGE UP (ref 1–3.3)
LYMPHOCYTES # BLD AUTO: 11.6 % — LOW (ref 13–44)
MCHC RBC-ENTMCNC: 33.3 PG — SIGNIFICANT CHANGE UP (ref 27–34)
MCHC RBC-ENTMCNC: 34.1 % — SIGNIFICANT CHANGE UP (ref 32–36)
MCV RBC AUTO: 97.6 FL — SIGNIFICANT CHANGE UP (ref 80–100)
MONOCYTES # BLD AUTO: 0.65 K/UL — SIGNIFICANT CHANGE UP (ref 0–0.9)
MONOCYTES NFR BLD AUTO: 6.7 % — SIGNIFICANT CHANGE UP (ref 2–14)
NEUTROPHILS # BLD AUTO: 7.5 K/UL — HIGH (ref 1.8–7.4)
NEUTROPHILS NFR BLD AUTO: 78 % — HIGH (ref 43–77)
NRBC # FLD: 0 K/UL — SIGNIFICANT CHANGE UP (ref 0–0)
PCO2 BLDV: 54 MMHG — HIGH (ref 41–51)
PH BLDV: 7.35 PH — SIGNIFICANT CHANGE UP (ref 7.32–7.43)
PLATELET # BLD AUTO: 236 K/UL — SIGNIFICANT CHANGE UP (ref 150–400)
PMV BLD: 9.3 FL — SIGNIFICANT CHANGE UP (ref 7–13)
PO2 BLDV: < 24 MMHG — LOW (ref 35–40)
POTASSIUM BLDV-SCNC: 3.9 MMOL/L — SIGNIFICANT CHANGE UP (ref 3.4–4.5)
POTASSIUM SERPL-MCNC: 4.1 MMOL/L — SIGNIFICANT CHANGE UP (ref 3.5–5.3)
POTASSIUM SERPL-SCNC: 4.1 MMOL/L — SIGNIFICANT CHANGE UP (ref 3.5–5.3)
PROT SERPL-MCNC: 7.8 G/DL — SIGNIFICANT CHANGE UP (ref 6–8.3)
PROTHROM AB SERPL-ACNC: 11.6 SEC — SIGNIFICANT CHANGE UP (ref 9.8–13.1)
RBC # BLD: 4.15 M/UL — LOW (ref 4.2–5.8)
RBC # FLD: 12.4 % — SIGNIFICANT CHANGE UP (ref 10.3–14.5)
SAO2 % BLDV: 28.1 % — LOW (ref 60–85)
SODIUM SERPL-SCNC: 136 MMOL/L — SIGNIFICANT CHANGE UP (ref 135–145)
WBC # BLD: 9.63 K/UL — SIGNIFICANT CHANGE UP (ref 3.8–10.5)
WBC # FLD AUTO: 9.63 K/UL — SIGNIFICANT CHANGE UP (ref 3.8–10.5)

## 2019-12-09 PROCEDURE — 99284 EMERGENCY DEPT VISIT MOD MDM: CPT | Mod: GC

## 2019-12-09 NOTE — ED PROVIDER NOTE - PATIENT PORTAL LINK FT
You can access the FollowMyHealth Patient Portal offered by [facility] by registering at the following website: [email or web address]. By joining [employer]’s FollowMyHealth portal, you will also be able to view your health information using other applications (apps) compatible with our system.
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
## 2019-12-09 NOTE — ED ADULT NURSE NOTE - OBJECTIVE STATEMENT
Gurpreet RN:  Pt received in spot 22, A&OX4, NAD.  Mandarin speaking, translation services offered, pt prefers daughter for translation.  Pt c/o SOB since yesterday afternoon, came to [facility] for eval but left without being evaluated because the wait time was too long.  Pt went to [facility] and told to come back to ED for evaluation and to r/o PE.  Pt has hx of lung CA with R upper lobe removal 11/7/19.  Not on any chemo or radiation.  Pt endorsing tightness to R side, improving since surgery.  Pt denies any chest pain/palpitations.  NSR on CM.  Respirations even and unlabored on room air.  Does not want any pain medication at this time.  Labs sent.  Report given to primary RN.

## 2019-12-09 NOTE — ED PROVIDER NOTE - SKIN, MLM
Skin normal color for race, warm, dry and intact. No evidence of rash. Right thoracic surgical scars well healed.

## 2019-12-09 NOTE — ED PROVIDER NOTE - PROGRESS NOTE DETAILS
pt pending trop to result and ct angio chest to be done. Pt no resp distress at rest with family at bedside. Endorse to Dr Bal. pt pending trop to result and ct angio chest to be done. Pt no resp distress at rest with family at bedside. Endorse to Dr Keenan Abe VELA: Pt signed out to me.  He reports feeling better, no SOB, no dyspnea on exertion.  CTA neg for PE.  Trop <6.  Pt is stable for dc home.

## 2019-12-09 NOTE — ED PROVIDER NOTE - ATTENDING CONTRIBUTION TO CARE
Attending Statement: I have personally seen and examined this patient. I have fully participated in the care of this patient. I have reviewed all pertinent clinical information, including history physical exam, plan and the Resident's note and agree except as noted  75yo M  history of hepatitis B was found to have enlarged lymph nodes and a solitary pulmonary right upper lung nodule and is s/p flexible bronchoscopy, mediastinoscopy, right video assisted thoracoscopy and robotic assisted right upper lobe wedge resection with completion lobectomy on 11/04/19. from home  co SOB today. Daughter helping w hx, pt declined translation services. States he was "ok" last night, slept well. This am woke up and "suddenly felt" SOB and PELAYO w min exertion. Slept from 10am to 2pm, got up "moving around the house was short of breath" +PELAYO, no chest pain. Seen at  neg xr  per family, sent to ED. Now at rest no complaints. no cp or sob. no recent fever or chills. no nausea or vomit. no leg swelling or calf pain. Former smoker. not on chemo/radiation. not on home oxy.   Vital signs noted. pulse ox  RA, laying flat , no distress. mmm. normal S1-S2 coarse bs, no retractions. healed scars on the right lateral chest wall. thin male nt abdomen. no pedal edema. no calf tenderness. normal pulses bilateral feet.  plan ekg, labs, cxr, ct angio chest rule out PE, tele monitoring, re assess

## 2019-12-09 NOTE — ED ADULT TRIAGE NOTE - CHIEF COMPLAINT QUOTE
Patient c/o shortness of breath starting 10am. Patient LWOBE earlier today and went to urgent care. Patient sent back to ER by urgent care for CT to r/o blood clot. Denies any chest pain. Hx. lung ca.

## 2019-12-09 NOTE — ED PROVIDER NOTE - OBJECTIVE STATEMENT
74y Male, previous smoker, presenting w SOB. Pt w recent hospitalization Nov 4th to 13th for lung adenocarcinoma s/p RUL Lobectomy on 11/4/19. Pt was well since discharge, however complains of SOB that began abruptly 10 am this morning. Denies associated symptoms of fever, chills, cough, chest pain. Does not use supplemental oxygen. Pt seen in urgent care today- reported normal xray. Sent to the ED for concern of ? blood clot as per family. Daughter acts as . States pt did not look good around 7-9 pm this evening, but looks better now. Daughter states he was lethargic.

## 2019-12-09 NOTE — ED PROVIDER NOTE - CARE PLAN
Principal Discharge DX:	Shortness of breath Principal Discharge DX:	Shortness of breath  Goal:	Follow UP  Assessment and plan of treatment:	Imaging in the ED was negative for pulmonary embolism. Please follow up with your PCP.

## 2019-12-09 NOTE — ED PROVIDER NOTE - CLINICAL SUMMARY MEDICAL DECISION MAKING FREE TEXT BOX
74y Male, previous smoker, presenting w SOB. Pt w recent hospitalization Nov 4th to 13th for lung adenocarcinoma s/p RUL Lobectomy on 11/4/19. R/o expanding pneumothorax, pulm effusion mentioned during most recent imaging. Less likely infectious etiology. Labs, EKG, CXR. Dispo likely home w outpt f/u. 74y Male, previous smoker, presenting w SOB. Pt w recent hospitalization Nov 4th to 13th for lung adenocarcinoma s/p RUL Lobectomy on 11/4/19. R/o expanding pneumothorax, pulm effusion mentioned during most recent imaging. Less likely infectious etiology. Labs, EKG, CXR.

## 2019-12-10 LAB — TROPONIN T, HIGH SENSITIVITY: < 6 NG/L — SIGNIFICANT CHANGE UP (ref ?–14)

## 2019-12-10 PROCEDURE — 71275 CT ANGIOGRAPHY CHEST: CPT | Mod: 26

## 2021-04-30 NOTE — PATIENT PROFILE ADULT - PRIMARY ROLES/RESPONSIBILITIES
PT Daily Note-Current


Subjective


Patient reported moderate, unrated pain following dressing change pre tx. 

Consented to PT/OT co-treat.





Appearance


Patient upright in bed, with tray table within reach and call button nearby. All

needs met post tx.





Mental Status


Patient Orientation:  Person, Place, Time, Normal For Age





Transfers


SCALE: Activities may be completed with or without assistive devices.





6-Indepedent-patient completes the activity by him/herself with no assistance 

from a helper.


5-Set-up or Clean-up Assistance-helper sets up or cleans up; patient completes 

activity. [place] assists only prior to or  


    following the activity.


4-Supervision or Touching Assistance-helper provides verbal cues and/or 

touching/steadying and/or contact guard assistance as patient completes 

activity. Assistance may be provided   


    throughout the activity or intermittently.


3-Partial/Moderate Assistance-helper does LESS THAN HALF the effort. [place] 

lifts, holds or supports trunk or limbs, but provides less than half the effort.


2-Substantial/Maximal Assistance-helper does MORE THAN HALF the effort. [place] 

lifts or holds trunk or limbs and provides more than half the effort.


5-Ujrgdnjmf-[id number] does ALL the effort. Patient does none of the effort to 

complete the activity. Or, the assistance of 2 or more helpers is required for 

the patient to complete the  


    activity.


If activity was not attempted, code reason:


7-Patient Refused.


9-Not Applicable-not attempted and the patient did not perform the activity 

before the current illness, exacerbation or injury.


10-Not Attempted due to Environmental Limitations-(lack of equipment, weather 

restraints, etc.).


88-Not Attempted due to Medical Conditions or Safety Concerns.





Weight Bearing


Right Lower Extremity:  Right


Full Weight Bearing


Left Lower Extremity:  Left


Non Weight Bearing (BKA)





Exercises


Supine Ex:  Quad Set


Supine Reps:  30


Seated Therapy Exercises:  Ankle pumps, Glut set


Seated Reps:  30


All seated/supine exercises only on R LE. Utilized isometric exercise due to 

patient incision weeping, to decrease pressure through L LE incision site.





Treatments


Co-treated with OT secondary to patient pain reports and incision site weeping. 

OT and PT utilized UE and core strengthening exercises to promote strength. Used

RTB to form sling to shoot ball into hamper, patient engaged RTB with scapular 

retraction, as well as abdominal muscles/obliques to aim ball before shooting. 

Patient shot 5x each side, and 10x straight on. Patient also used 2# cuff weigh

ts around wrists and used large theraball to lift above head, to stomach, and 

then to each side of body to activate core muscles and UE strength. Exercise 

performed 20x each side.





Assessment


Current Status:  Fair Progress


Good endurance with overhead activities that engaged the core.





PT Short Term Goals


Short Term Goals


Time Frame:  2021


Roll Left & Right:  6


Sit to lyin


Lying to sitting on side of be:  6


Sit to stand:  3


Chair/bed-to-chair transfer:  3


Wheel 50ft w/2 turns:  6


Wheel 150 feet:  6





PT Long Term Goals


Long Term Goals


PT Long Term Goals Time Frame:  May 19, 2021


Roll Left & Right (QC):  6


Sit to Lying (QC):  6


Lying-Sitting on Side/Bed(QC):  6


Sit to Stand (QC):  6


Chair/Bed-to-Chair Xfer(QC):  6 (SPT or slideboard, whichever is the safest m

ethod)


Toilet Transfer (QC):  6 (SPT or slide board)


Car Transfer (QC):  4


Does the Patient Walk:  No and Walking Goal NOT indicated


Walk 10 feet (QC):  88


Walk 50ft with 2 Turns (QC):  88


Walk 150 ft (QC):  88


Walking 10ft on Uneven Surface:  88


1 Step (curb) (QC):  88


4 Steps (QC):  88


12 Steps (QC):  88


Picking up an Object (QC):  88


Does the Pt use WC or Scooter?:  Yes


Wheel 50 feet with 2 turns (QC:  6


Type:  Manual


Wheel 150 feet:  6


Type:  Manual





PT Plan


Problem List


Problem List:  Activity Tolerance, Functional Strength, Safety, Balance, Gait, 

Transfer, Bed Mobility, ROM





Treatment/Plan


Treatment Plan:  Continue Plan of Care


Treatment Plan:  Bed Mobility, Education, Functional Activity Aung, Functional 

Strength, Group Therapy, Gait, Safety, Therapeutic Exercise, Transfers


Treatment Duration:  May 19, 2021


Frequency:  At least 5 of 7 days/Wk (IRF)


Estimated Hrs Per Day:  1.5 hours per day


Patient and/or Family Agrees t:  Yes





Safety Risks/Education


Patient Education:  Correct Positioning, Safety Issues


Teaching Recipient:  Patient


Teaching Methods:  Demonstration, Discussion


Response to Teaching:  Verbalize Understanding, Return Demonstration





Time/GCodes


Time In:  1300


Time Out:  1330


Total Billed Treatment Time:  30


Total Billed Treatment


1 visit: 


EX x2: 30'











CAMILLE HARRY PT                2021 13:52 none

## 2023-07-25 ENCOUNTER — APPOINTMENT (OUTPATIENT)
Dept: THORACIC SURGERY | Facility: CLINIC | Age: 78
End: 2023-07-25
Payer: MEDICARE

## 2023-07-25 VITALS
WEIGHT: 139 LBS | HEART RATE: 69 BPM | BODY MASS INDEX: 21.82 KG/M2 | OXYGEN SATURATION: 94 % | HEIGHT: 67 IN | SYSTOLIC BLOOD PRESSURE: 109 MMHG | DIASTOLIC BLOOD PRESSURE: 75 MMHG

## 2023-07-25 DIAGNOSIS — R91.1 SOLITARY PULMONARY NODULE: ICD-10-CM

## 2023-07-25 DIAGNOSIS — C34.91 MALIGNANT NEOPLASM OF UNSPECIFIED PART OF RIGHT BRONCHUS OR LUNG: ICD-10-CM

## 2023-07-25 PROCEDURE — 99204 OFFICE O/P NEW MOD 45 MIN: CPT

## 2023-07-25 RX ORDER — IBUPROFEN 800 MG/1
800 TABLET, FILM COATED ORAL 3 TIMES DAILY
Qty: 90 | Refills: 0 | Status: COMPLETED | COMMUNITY
Start: 2019-11-13 | End: 2023-07-25

## 2023-07-25 RX ORDER — ALBUTEROL SULFATE 90 UG/1
108 (90 BASE) INHALANT RESPIRATORY (INHALATION)
Qty: 18 | Refills: 0 | Status: COMPLETED | COMMUNITY
Start: 2019-10-02 | End: 2023-07-25

## 2023-07-25 RX ORDER — UMECLIDINIUM BROMIDE AND VILANTEROL TRIFENATATE 62.5; 25 UG/1; UG/1
62.5-25 POWDER RESPIRATORY (INHALATION)
Refills: 0 | Status: ACTIVE | COMMUNITY

## 2023-07-25 NOTE — ASSESSMENT
[FreeTextEntry1] : Mr. GÓMEZ RODRIGUEZ, 77 year old male, former smoker (1-2ppd x 55 yrs, quit 04 2019), w/ hx of Hep B, COPD/Emphysema, and RUL AdenoCA s/p rxn in 11/2019.\par \par Now 3 yrs 8 mo s/p Cervical mediastinoscopy, Right VATS, Robotic-assisted, RUL Wedge resection of segmental lung nodule, RULobectomy and MLND on 11/04/2019. 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 CT scan showed no evidence of recurrence, no new lungs, I recommended patient to return to office in 1 year w/ CT Chest w/o contrast.\par 2. Continue f/u with Dr. Mal Maradiaga.\par \par \par I, ELIGIO Schultz, personally performed the evaluation and management (E/M) services for this established patient who presents today with (a) new problem(s)/exacerbation of (an) existing condition(s). That E/M includes conducting the examination, assessing all new/exacerbated conditions, and establishing a new plan of care. Today, my ACP, Ermias Barcenas NP was here to observe my evaluation and management services for this new problem/exacerbated condition to be followed going forward.\par \par

## 2023-07-25 NOTE — CONSULT LETTER
[FreeTextEntry2] : Dr. Mitra Vela (referring)\par Xavier Roa MD (PCP)\par Dr. Mal Maradiaga (Pulm)  [FreeTextEntry3] : Lon Khan MD, MPH \par System Director of Thoracic Surgery \par Director of Comprehensive Lung and Foregut [place] \par Professor Cardiovascular & Thoracic Surgery  \par [facility] [employer] [facility]\par \par [facility]\par [address]\par Oncology [address]\[place] [address]\par Tel: [phone]\par Fax: [phone]\par

## 2023-07-25 NOTE — HISTORY OF PRESENT ILLNESS
[FreeTextEntry1] : Mr. GÓMEZ RODRIGUEZ, 77 year old male, former smoker (1-2ppd x 55 yrs, quit 04 2019), w/ hx of Hep B, COPD/Emphysema, and RUL AdenoCA s/p rxn in 11/2019.\par \par PET/CT on 10/1/19:\par - 9 x 9mm SUV=4.6 RUL spiculated nodule abutting the pleura\par - subcentimeter Rt hilar LN w/ SUV=4.6 (image 110)\par - Rt hilar node (image 122)\par - subcarinal LN w/ SUV=3.2 (image 118)\par - Rt lower paratracheal LN with SUV=4.1\par - Lt tracheobronchial LN w/ SUV=3.4 (image 104)\par - Lt hilar LN w/ SUV=3.4 (image 111)\par - small para-aortic LN w/ SUV=2.2 (image 101)\par \par FB, EBUS bx on 10/4/19 at [place]. Path negative for malignancy. BAL negative malignancy. Slides reviewed by [facility] Path Dept on 11/6/19, 1.5cm Rt Lvl 10 negative for malignancy, BAL also negative.\par \par Now 3 yrs 8 mo s/p Cervical mediastinoscopy, Right VATS, Robotic-assisted, RUL Wedge resection of segmental lung nodule, RULobectomy and MLND on 11/04/2019. 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 Admits to SOB 2/2 COPD/Emphysema.\par \par

## 2023-07-25 NOTE — DATA REVIEWED
[FreeTextEntry1] : I have independently reviewed the following:\par CT Chest on 6/21/23 at [facility]
2

## 2023-07-25 NOTE — PHYSICAL EXAM
[Fully active, able to carry on all pre-disease performance without restriction] : Status 0 - Fully active, able to carry on all pre-disease performance without restriction [General Appearance - Alert] : alert [General Appearance - In No Acute Distress] : in no acute distress [Sclera] : the sclera and conjunctiva were normal [PERRL With Normal Accommodation] : pupils were equal in size, round, and reactive to light [Extraocular Movements] : extraocular movements were intact [Outer Ear] : the ears and nose were normal in appearance [Oropharynx] : the oropharynx was normal [Neck Appearance] : the appearance of the neck was normal [Neck Cervical Mass (___cm)] : no neck mass was observed [Jugular Venous Distention Increased] : there was no jugular-venous distention [Thyroid Diffuse Enlargement] : the thyroid was not enlarged [Thyroid Nodule] : there were no palpable thyroid nodules [Auscultation Breath Sounds / Voice Sounds] : lungs were clear to auscultation bilaterally [Heart Rate And Rhythm] : heart rate was normal and rhythm regular [Heart Sounds] : normal S1 and S2 [Heart Sounds Gallop] : no gallops [Murmurs] : no murmurs [Heart Sounds Pericardial Friction Rub] : no pericardial rub [Examination Of The Chest] : the chest was normal in appearance [Chest Visual Inspection Thoracic Asymmetry] : no chest asymmetry [Diminished Respiratory Excursion] : normal chest expansion [2+] : left 2+ [Bowel Sounds] : normal bowel sounds [Abdomen Soft] : soft [Abdomen Tenderness] : non-tender [Abdomen Mass (___ Cm)] : no abdominal mass palpated [Cervical Lymph Nodes Enlarged Posterior Bilaterally] : posterior cervical [Cervical Lymph Nodes Enlarged Anterior Bilaterally] : anterior cervical [Supraclavicular Lymph Nodes Enlarged Bilaterally] : supraclavicular [No CVA Tenderness] : no ~M costovertebral angle tenderness [No Spinal Tenderness] : no spinal tenderness [Abnormal Walk] : normal gait [Nail Clubbing] : no clubbing  or cyanosis of the fingernails [Musculoskeletal - Swelling] : no joint swelling seen [Motor Tone] : muscle strength and tone were normal [Skin Color & Pigmentation] : normal skin color and pigmentation [Skin Turgor] : normal skin turgor [] : no rash [Deep Tendon Reflexes (DTR)] : deep tendon reflexes were 2+ and symmetric [Sensation] : the sensory exam was normal to light touch and pinprick [Oriented To Time, Place, And Person] : oriented to person, place, and time [No Focal Deficits] : no focal deficits [Impaired Insight] : insight and judgment were intact [Affect] : the affect was normal

## 2023-07-26 ENCOUNTER — NON-APPOINTMENT (OUTPATIENT)
Age: 78
End: 2023-07-26

## 2023-07-27 ENCOUNTER — APPOINTMENT (OUTPATIENT)
Dept: THORACIC SURGERY | Facility: CLINIC | Age: 78
End: 2023-07-27
Payer: MEDICARE

## 2023-07-27 ENCOUNTER — NON-APPOINTMENT (OUTPATIENT)
Age: 78
End: 2023-07-27

## 2024-05-16 NOTE — DIETITIAN INITIAL EVALUATION ADULT. - ETIOLOGY
"Rtn pt call via  "José Manuel" Left message for patient to return call to [phone].     Lyndsey DURAN LPN  OB/GYN     "
----- Message from Pily Madrid LPN sent at 5/15/2024  4:35 PM CDT -----  Fozia Wright sent to [facility] Clinical Staff  Caller: Liset (Today,  3:50 PM)  Liset is needing a call back to get her nexplanon insertion scheduled. Please give her a call at [phone]  
In the context of acute illness